# Patient Record
Sex: MALE | Race: BLACK OR AFRICAN AMERICAN | Employment: OTHER | URBAN - METROPOLITAN AREA
[De-identification: names, ages, dates, MRNs, and addresses within clinical notes are randomized per-mention and may not be internally consistent; named-entity substitution may affect disease eponyms.]

---

## 2017-05-26 ENCOUNTER — HOSPITAL ENCOUNTER (OUTPATIENT)
Dept: PREADMISSION TESTING | Age: 37
Discharge: HOME | End: 2017-05-26

## 2017-05-26 ENCOUNTER — HOSPITAL ENCOUNTER (OUTPATIENT)
Dept: LAB | Age: 37
Discharge: HOME | End: 2017-05-26

## 2017-05-26 ENCOUNTER — HOSPITAL ENCOUNTER (EMERGENCY)
Age: 37
Discharge: STILL ADMITTED OR ADMINISTRATIVE | End: 2017-05-26
Attending: EMERGENCY MEDICINE
Payer: MEDICAID

## 2017-05-26 DIAGNOSIS — N20.0 KIDNEY STONES: ICD-10-CM

## 2017-05-26 LAB — SENTARA SPECIMEN COL,SENBCF: NORMAL

## 2017-05-26 PROCEDURE — 99001 SPECIMEN HANDLING PT-LAB: CPT | Performed by: UROLOGY

## 2017-05-26 PROCEDURE — 75810000275 HC EMERGENCY DEPT VISIT NO LEVEL OF CARE

## 2017-06-01 RX ORDER — INSULIN LISPRO 100 [IU]/ML
15 INJECTION, SOLUTION INTRAVENOUS; SUBCUTANEOUS
COMMUNITY

## 2017-06-01 RX ORDER — INSULIN GLARGINE 100 [IU]/ML
70 INJECTION, SOLUTION SUBCUTANEOUS
COMMUNITY

## 2017-06-01 RX ORDER — CLONIDINE HYDROCHLORIDE 0.2 MG/1
0.2 TABLET ORAL 2 TIMES DAILY
COMMUNITY
End: 2021-04-26

## 2017-06-02 ENCOUNTER — ANESTHESIA EVENT (OUTPATIENT)
Dept: SURGERY | Age: 37
End: 2017-06-02
Payer: MEDICAID

## 2017-06-05 ENCOUNTER — APPOINTMENT (OUTPATIENT)
Dept: GENERAL RADIOLOGY | Age: 37
End: 2017-06-05
Attending: UROLOGY
Payer: MEDICAID

## 2017-06-05 ENCOUNTER — ANESTHESIA (OUTPATIENT)
Dept: SURGERY | Age: 37
End: 2017-06-05
Payer: MEDICAID

## 2017-06-05 ENCOUNTER — HOSPITAL ENCOUNTER (OUTPATIENT)
Age: 37
Setting detail: OUTPATIENT SURGERY
Discharge: HOME | End: 2017-06-05
Attending: UROLOGY | Admitting: UROLOGY
Payer: MEDICAID

## 2017-06-05 VITALS
BODY MASS INDEX: 44.1 KG/M2 | TEMPERATURE: 97.6 F | WEIGHT: 315 LBS | SYSTOLIC BLOOD PRESSURE: 142 MMHG | OXYGEN SATURATION: 94 % | HEIGHT: 71 IN | HEART RATE: 82 BPM | DIASTOLIC BLOOD PRESSURE: 72 MMHG | RESPIRATION RATE: 16 BRPM

## 2017-06-05 PROBLEM — N23 RENAL COLIC ON LEFT SIDE: Status: ACTIVE | Noted: 2017-06-05

## 2017-06-05 PROBLEM — N20.1 LEFT URETERAL CALCULUS: Status: ACTIVE | Noted: 2017-06-05

## 2017-06-05 LAB
ATRIAL RATE: 74 BPM
CALCULATED P AXIS, ECG09: 45 DEGREES
CALCULATED R AXIS, ECG10: -63 DEGREES
CALCULATED T AXIS, ECG11: 49 DEGREES
DIAGNOSIS, 93000: NORMAL
GLUCOSE BLD STRIP.AUTO-MCNC: 109 MG/DL (ref 70–110)
GLUCOSE BLD STRIP.AUTO-MCNC: 137 MG/DL (ref 70–110)
P-R INTERVAL, ECG05: 170 MS
Q-T INTERVAL, ECG07: 374 MS
QRS DURATION, ECG06: 100 MS
QTC CALCULATION (BEZET), ECG08: 415 MS
VENTRICULAR RATE, ECG03: 74 BPM

## 2017-06-05 PROCEDURE — 74011000250 HC RX REV CODE- 250

## 2017-06-05 PROCEDURE — 74011250636 HC RX REV CODE- 250/636

## 2017-06-05 PROCEDURE — 76210000016 HC OR PH I REC 1 TO 1.5 HR: Performed by: UROLOGY

## 2017-06-05 PROCEDURE — 77030018846 HC SOL IRR STRL H20 ICUM -A: Performed by: UROLOGY

## 2017-06-05 PROCEDURE — 82962 GLUCOSE BLOOD TEST: CPT

## 2017-06-05 PROCEDURE — C1769 GUIDE WIRE: HCPCS | Performed by: UROLOGY

## 2017-06-05 PROCEDURE — 77030018832 HC SOL IRR H20 ICUM -A: Performed by: UROLOGY

## 2017-06-05 PROCEDURE — 74011636320 HC RX REV CODE- 636/320: Performed by: UROLOGY

## 2017-06-05 PROCEDURE — 76010000161 HC OR TIME 1 TO 1.5 HR INTENSV-TIER 1: Performed by: UROLOGY

## 2017-06-05 PROCEDURE — 74011000258 HC RX REV CODE- 258

## 2017-06-05 PROCEDURE — 76210000020 HC REC RM PH II FIRST 0.5 HR: Performed by: UROLOGY

## 2017-06-05 PROCEDURE — 74011250637 HC RX REV CODE- 250/637: Performed by: ANESTHESIOLOGY

## 2017-06-05 PROCEDURE — 77030006974 HC BSKT URET RTVR BSC -C: Performed by: UROLOGY

## 2017-06-05 PROCEDURE — C2617 STENT, NON-COR, TEM W/O DEL: HCPCS | Performed by: UROLOGY

## 2017-06-05 PROCEDURE — 74011250636 HC RX REV CODE- 250/636: Performed by: NURSE ANESTHETIST, CERTIFIED REGISTERED

## 2017-06-05 PROCEDURE — 77030032490 HC SLV COMPR SCD KNE COVD -B: Performed by: UROLOGY

## 2017-06-05 PROCEDURE — 77030018836 HC SOL IRR NACL ICUM -A: Performed by: UROLOGY

## 2017-06-05 PROCEDURE — C1758 CATHETER, URETERAL: HCPCS | Performed by: UROLOGY

## 2017-06-05 PROCEDURE — 77030012961 HC IRR KT CYSTO/TUR ICUM -A: Performed by: UROLOGY

## 2017-06-05 PROCEDURE — 76060000033 HC ANESTHESIA 1 TO 1.5 HR: Performed by: UROLOGY

## 2017-06-05 PROCEDURE — 74011250636 HC RX REV CODE- 250/636: Performed by: UROLOGY

## 2017-06-05 PROCEDURE — 93005 ELECTROCARDIOGRAM TRACING: CPT

## 2017-06-05 DEVICE — URETERAL STENT
Type: IMPLANTABLE DEVICE | Site: URETER | Status: FUNCTIONAL
Brand: POLARIS™ ULTRA

## 2017-06-05 RX ORDER — GENTAMICIN SULFATE 40 MG/ML
INJECTION, SOLUTION INTRAMUSCULAR; INTRAVENOUS AS NEEDED
Status: DISCONTINUED | OUTPATIENT
Start: 2017-06-05 | End: 2017-06-05 | Stop reason: HOSPADM

## 2017-06-05 RX ORDER — MIDAZOLAM HYDROCHLORIDE 1 MG/ML
INJECTION, SOLUTION INTRAMUSCULAR; INTRAVENOUS AS NEEDED
Status: DISCONTINUED | OUTPATIENT
Start: 2017-06-05 | End: 2017-06-05 | Stop reason: HOSPADM

## 2017-06-05 RX ORDER — INSULIN LISPRO 100 [IU]/ML
INJECTION, SOLUTION INTRAVENOUS; SUBCUTANEOUS ONCE
Status: DISCONTINUED | OUTPATIENT
Start: 2017-06-06 | End: 2017-06-05 | Stop reason: HOSPADM

## 2017-06-05 RX ORDER — MAGNESIUM SULFATE 100 %
4 CRYSTALS MISCELLANEOUS AS NEEDED
Status: DISCONTINUED | OUTPATIENT
Start: 2017-06-05 | End: 2017-06-05 | Stop reason: HOSPADM

## 2017-06-05 RX ORDER — FENTANYL CITRATE 50 UG/ML
INJECTION, SOLUTION INTRAMUSCULAR; INTRAVENOUS AS NEEDED
Status: DISCONTINUED | OUTPATIENT
Start: 2017-06-05 | End: 2017-06-05 | Stop reason: HOSPADM

## 2017-06-05 RX ORDER — SODIUM CHLORIDE 0.9 % (FLUSH) 0.9 %
5-10 SYRINGE (ML) INJECTION AS NEEDED
Status: DISCONTINUED | OUTPATIENT
Start: 2017-06-05 | End: 2017-06-05 | Stop reason: HOSPADM

## 2017-06-05 RX ORDER — DOCUSATE SODIUM 100 MG/1
100 CAPSULE, LIQUID FILLED ORAL 2 TIMES DAILY
Qty: 60 CAP | Refills: 0 | Status: SHIPPED | OUTPATIENT
Start: 2017-06-05 | End: 2017-06-13

## 2017-06-05 RX ORDER — DEXTROSE MONOHYDRATE 25 G/50ML
25-50 INJECTION, SOLUTION INTRAVENOUS AS NEEDED
Status: DISCONTINUED | OUTPATIENT
Start: 2017-06-05 | End: 2017-06-05 | Stop reason: HOSPADM

## 2017-06-05 RX ORDER — CEFAZOLIN SODIUM 2 G/50ML
2 SOLUTION INTRAVENOUS
Status: DISCONTINUED | OUTPATIENT
Start: 2017-06-05 | End: 2017-06-05

## 2017-06-05 RX ORDER — HYDROMORPHONE HYDROCHLORIDE 2 MG/ML
0.5 INJECTION, SOLUTION INTRAMUSCULAR; INTRAVENOUS; SUBCUTANEOUS AS NEEDED
Status: DISCONTINUED | OUTPATIENT
Start: 2017-06-05 | End: 2017-06-05 | Stop reason: HOSPADM

## 2017-06-05 RX ORDER — LIDOCAINE HYDROCHLORIDE 20 MG/ML
INJECTION, SOLUTION EPIDURAL; INFILTRATION; INTRACAUDAL; PERINEURAL AS NEEDED
Status: DISCONTINUED | OUTPATIENT
Start: 2017-06-05 | End: 2017-06-05 | Stop reason: HOSPADM

## 2017-06-05 RX ORDER — PROPOFOL 10 MG/ML
INJECTION, EMULSION INTRAVENOUS AS NEEDED
Status: DISCONTINUED | OUTPATIENT
Start: 2017-06-05 | End: 2017-06-05 | Stop reason: HOSPADM

## 2017-06-05 RX ORDER — ONDANSETRON 2 MG/ML
4 INJECTION INTRAMUSCULAR; INTRAVENOUS ONCE
Status: DISCONTINUED | OUTPATIENT
Start: 2017-06-05 | End: 2017-06-05 | Stop reason: HOSPADM

## 2017-06-05 RX ORDER — OXYCODONE AND ACETAMINOPHEN 5; 325 MG/1; MG/1
1-2 TABLET ORAL
Qty: 30 TAB | Refills: 0 | Status: SHIPPED | OUTPATIENT
Start: 2017-06-05 | End: 2017-06-05

## 2017-06-05 RX ORDER — INSULIN LISPRO 100 [IU]/ML
INJECTION, SOLUTION INTRAVENOUS; SUBCUTANEOUS ONCE
Status: DISCONTINUED | OUTPATIENT
Start: 2017-06-05 | End: 2017-06-05 | Stop reason: HOSPADM

## 2017-06-05 RX ORDER — SODIUM CHLORIDE, SODIUM LACTATE, POTASSIUM CHLORIDE, CALCIUM CHLORIDE 600; 310; 30; 20 MG/100ML; MG/100ML; MG/100ML; MG/100ML
125 INJECTION, SOLUTION INTRAVENOUS CONTINUOUS
Status: DISCONTINUED | OUTPATIENT
Start: 2017-06-05 | End: 2017-06-05 | Stop reason: HOSPADM

## 2017-06-05 RX ORDER — OXYCODONE AND ACETAMINOPHEN 5; 325 MG/1; MG/1
1 TABLET ORAL
Status: COMPLETED | OUTPATIENT
Start: 2017-06-05 | End: 2017-06-05

## 2017-06-05 RX ORDER — CEPHALEXIN 500 MG/1
500 CAPSULE ORAL 3 TIMES DAILY
Qty: 21 CAP | Refills: 0 | Status: SHIPPED | OUTPATIENT
Start: 2017-06-05 | End: 2017-06-12

## 2017-06-05 RX ORDER — HYDROMORPHONE HYDROCHLORIDE 2 MG/1
2 TABLET ORAL
Qty: 15 TAB | Refills: 0 | Status: SHIPPED | OUTPATIENT
Start: 2017-06-05 | End: 2021-04-26

## 2017-06-05 RX ORDER — SODIUM CHLORIDE, SODIUM LACTATE, POTASSIUM CHLORIDE, CALCIUM CHLORIDE 600; 310; 30; 20 MG/100ML; MG/100ML; MG/100ML; MG/100ML
50 INJECTION, SOLUTION INTRAVENOUS CONTINUOUS
Status: DISCONTINUED | OUTPATIENT
Start: 2017-06-06 | End: 2017-06-05 | Stop reason: HOSPADM

## 2017-06-05 RX ORDER — SODIUM CHLORIDE 0.9 % (FLUSH) 0.9 %
5-10 SYRINGE (ML) INJECTION EVERY 8 HOURS
Status: DISCONTINUED | OUTPATIENT
Start: 2017-06-05 | End: 2017-06-05 | Stop reason: HOSPADM

## 2017-06-05 RX ORDER — FAMOTIDINE 20 MG/1
TABLET, FILM COATED ORAL
Status: DISCONTINUED
Start: 2017-06-05 | End: 2017-06-05 | Stop reason: HOSPADM

## 2017-06-05 RX ORDER — FAMOTIDINE 20 MG/1
20 TABLET, FILM COATED ORAL ONCE
Status: DISCONTINUED | OUTPATIENT
Start: 2017-06-06 | End: 2017-06-05 | Stop reason: HOSPADM

## 2017-06-05 RX ADMIN — FENTANYL CITRATE 50 MCG: 50 INJECTION, SOLUTION INTRAMUSCULAR; INTRAVENOUS at 13:50

## 2017-06-05 RX ADMIN — SODIUM CHLORIDE, SODIUM LACTATE, POTASSIUM CHLORIDE, AND CALCIUM CHLORIDE 125 ML/HR: 600; 310; 30; 20 INJECTION, SOLUTION INTRAVENOUS at 15:21

## 2017-06-05 RX ADMIN — FENTANYL CITRATE 50 MCG: 50 INJECTION, SOLUTION INTRAMUSCULAR; INTRAVENOUS at 14:31

## 2017-06-05 RX ADMIN — LIDOCAINE HYDROCHLORIDE 100 MG: 20 INJECTION, SOLUTION EPIDURAL; INFILTRATION; INTRACAUDAL; PERINEURAL at 13:50

## 2017-06-05 RX ADMIN — PROPOFOL 200 MG: 10 INJECTION, EMULSION INTRAVENOUS at 13:50

## 2017-06-05 RX ADMIN — FENTANYL CITRATE 50 MCG: 50 INJECTION, SOLUTION INTRAMUSCULAR; INTRAVENOUS at 14:08

## 2017-06-05 RX ADMIN — SODIUM CHLORIDE, SODIUM LACTATE, POTASSIUM CHLORIDE, AND CALCIUM CHLORIDE 50 ML/HR: 600; 310; 30; 20 INJECTION, SOLUTION INTRAVENOUS at 12:59

## 2017-06-05 RX ADMIN — FENTANYL CITRATE 50 MCG: 50 INJECTION, SOLUTION INTRAMUSCULAR; INTRAVENOUS at 14:01

## 2017-06-05 RX ADMIN — OXYCODONE HYDROCHLORIDE AND ACETAMINOPHEN 1 TABLET: 5; 325 TABLET ORAL at 16:46

## 2017-06-05 RX ADMIN — HYDROMORPHONE HYDROCHLORIDE 0.5 MG: 2 INJECTION INTRAMUSCULAR; INTRAVENOUS; SUBCUTANEOUS at 15:39

## 2017-06-05 RX ADMIN — HYDROMORPHONE HYDROCHLORIDE 0.5 MG: 2 INJECTION INTRAMUSCULAR; INTRAVENOUS; SUBCUTANEOUS at 15:24

## 2017-06-05 RX ADMIN — HYDROMORPHONE HYDROCHLORIDE 0.5 MG: 2 INJECTION INTRAMUSCULAR; INTRAVENOUS; SUBCUTANEOUS at 15:18

## 2017-06-05 RX ADMIN — MIDAZOLAM HYDROCHLORIDE 2 MG: 1 INJECTION, SOLUTION INTRAMUSCULAR; INTRAVENOUS at 13:50

## 2017-06-05 RX ADMIN — SODIUM CHLORIDE, SODIUM LACTATE, POTASSIUM CHLORIDE, AND CALCIUM CHLORIDE 125 ML/HR: 600; 310; 30; 20 INJECTION, SOLUTION INTRAVENOUS at 15:41

## 2017-06-05 NOTE — BRIEF OP NOTE
BRIEF OPERATIVE NOTE    Date of Procedure: 6/5/2017   Preoperative Diagnosis: left proximal ureteral calculus with refractory colic S/P stent placement. Postoperative Diagnosis: Left kidney stone, radiolucent. Procedure(s):  CYSTOSCOPY WITH left RETROGRADES, left ureteroscopy,holmium laser litho left uretera stent placement  Surgeon(s) and Role:     * Ca Thomas MD - Primary         Assistant Staff:       Surgical Staff:  Circ-1: Vidal Walls  Radiology Technician: Maryellen Pedersen  Scrub Tech-1: Enid Helm    Event Time In   Incision Start  2:07 PM   Incision Close  2:53 PM     Anesthesia: General   Estimated Blood Loss: minimal  Specimens: none  Findings: Large lower pole renal calculus migrated up to the lower pole from proximal ureter at time of stent placement. Fragmented totally. No stone fragments greater than 1 mm. Complications: none  Implants:   Implant Name Type Inv.  Item Serial No.  Lot No. LRB No. Used Action   Nelsy Munguia DBL-PGTL [id number] -- [employer] - [id number]   Nelsy Munguia DBL-PGTL [id number] -- Latosha Jones [facility] [id number] Left 1 Implanted       Ca Thomas MD

## 2017-06-05 NOTE — H&P
H&P (Cysto, left ureteroscopy, left retrograde pyelogram, possible JJ stent replacement with holmium laser)     ASSESSMENT:       ICD-10-CM ICD-9-CM     1. Ureteral stone N20.1 592. 1     2. Kidney stone N20.0 592.0 AMB POC URINALYSIS DIP STICK AUTO W/O MICRO   3. Nocturia R35.1 788.43 AMB POC PVR, ESAU,POST-VOID RES,US,NON-IMAGING          1. Mildly obstructing proximal left ureteral calculus measuring 1.6 cm seen on CT A/P w/ cont 3/24/17  2. Left non-obstructing kidney stones: multiple additional small calculi within the more proximal left ureter, left renal pelvis, and lower pole collecting system seen on CT A/P w/ cont 3/24/17. 3. Cellulitis in left foot: marked inflammation. Hot. Pt with history of recurrent cellulitis. Last infection last July. Report to ER now.          PLAN:    1. Pt told to report to ER for cellulitis. 2. Discussed options for treatment of stones: lithotripsy vs. Ureteroscopy. Recommend ureteroscopy to help treat multiple stones. Discussed indications and r/b. Will schedule for after pt's cellulitis clears. 3. Follow up after surgery for LithoLink, discuss dietary modifications.      To OR for cysto, left ureteroscopy, left retrograde, possible left JJ stent replacement with holmium laser.                 Chief Complaint   Patient presents with    Kidney Stone       stent placed at ED 4/23/17         HISTORY OF PRESENT ILLNESS: Marek Brooks is a 43 y.o. male who presents today in hospital follow up for left ureteral calculus. Pt reported to ED on 3/24/17 with flank pain and f/c/n/v. Presenting CT A/P showed mildly obstructing proximal left ureteral calculus measuring 1.6 cm and multiple non-obstructing stones in the left kidney. Pt was managed with pain medications for one month before cysto, left JJ stent placement by Dr. Suhail Phoenix on 4/24/17. He presents today with stent in place. Follow up [address] shows stent in good placement.  Pt presents today without noticeable abdominal pain. He is tolerating stent well.      Pt does have noticeable inflammation in the left foot today. It is very hot. Pt is in a lot of pain. He has history of cellulitis and has had multiple flares similar to this one.      Review of Systems  Constitutional: Fever: No  Skin: Rash: No  HEENT: Hearing difficulty: No  Eyes: Blurred vision: No  Cardiovascular: Chest pain: No  Respiratory: Shortness of breath: No  Gastrointestinal: Nausea/vomiting: No  Musculoskeletal: Back pain: Yes  herniated disc  Neurological: Weakness: No  Psychological: Memory loss: No  Comments/additional findings:                            Past Medical History:   Diagnosis Date    Acute kidney injury ([facility] [address]      Anemia       chronic microcytic.  Atrial fibrillation with RVR (HCC)      Chronic cough      Chronic diastolic (congestive) heart failure (HCC)      CKD (chronic kidney disease), stage II      Diabetes mellitus (HCC)      Essential hypertension, malignant      Gastritis and duodenitis      Head ache      History of MRSA infection      Hyperlipidemia      Immunosuppression (HCC)      Kidney stone      Morbid obesity (HCC)      SOLOMON on CPAP      Pulmonary embolus (HCC)      Renal calculi      Unspecified essential hypertension                 Past Surgical History:   Procedure Laterality Date    HX KNEE ARTHROSCOPY        HX OTHER SURGICAL         Thoracoscopy - multiple.  HX UROLOGICAL Left 04/23/2017     [facility]. Cysto ih stent.  Dr. Kali Benites.                 Social History   Substance Use Topics    Smoking status: Former Smoker       Types: Cigarettes    Smokeless tobacco: Never Used    Alcohol use 0.6 oz/week        1 Glasses of wine per week                Allergies   Allergen Reactions    Norco [Hydrocodone-Acetaminophen] Other (comments)       Pt states he is not allergic to norco               Family History   Problem Relation Age of Onset    Cancer Mother      Hypertension Mother      Migraines Mother      Diabetes Father      Hypertension Father      Cancer Maternal Grandmother      Hypertension Maternal Grandmother      Migraines Maternal Grandmother      Cancer Maternal Grandfather                  Current Outpatient Prescriptions   Medication Sig Dispense Refill    amLODIPine (NORVASC) 10 mg tablet          ELIQUIS 5 mg tablet          carvedilol (COREG) 25 mg tablet          cloNIDine (CATAPRES) 0.3 mg/24 hr APPLY 1 PATCH AS DIRECTED EVERY 7 DAYS   1    glipiZIDE (GLUCOTROL) 5 mg tablet          hydrALAZINE (APRESOLINE) 100 mg tablet          chlorpheniramine-HYDROcodone (TUSSIONEX) 10-8 mg/5 mL suspension SHAKE WELL AND TAKE 5MLS PO BID   0    omeprazole (PRILOSEC) 40 mg capsule          ondansetron (ZOFRAN ODT) 4 mg disintegrating tablet dissolve 1 tablet ON TONGUE every 8 hours if needed for nausea and vomiting   0    predniSONE (DELTASONE) 2.5 mg tablet          oxyCODONE-acetaminophen (PERCOCET 10)  mg per tablet          sucralfate (CARAFATE) 1 gram tablet TK 1 T PO Q 6 HOURS   0    tamsulosin (FLOMAX) 0.4 mg capsule          trospium (SANCTURA) 20 mg tablet                  PHYSICAL EXAMINATION:        Visit Vitals    BP (!) 140/92    Ht 5' 11\" (1.803 m)    Wt 316 lb (143.3 kg)    BMI 44.07 kg/m2      Constitutional: WDWN, Pleasant and appropriate affect, No acute distress. CV: No peripheral swelling noted, RRR  Respiratory: No respiratory distress or difficulties, CTAB  Abdomen: No abdominal masses or tenderness. No CVA tenderness. No inguinal hernias noted. Skin: No jaundice, left foot erythema and warmth with tenderness. Neuro/Psych: Alert and oriented x 3, affect appropriate. Lymphatic: No enlarged inguinal lymph nodes.         REVIEW OF LABS AND IMAGING:   No UA today.      Imaging Report Reviewed? YES Type: CT scan, NIRAJ  Images Reviewed? YES Type: CT scan     US Abd 4/25/2017:  IMPRESSION:   No hydronephrosis. Left renal stent is present.   Simple appearing left renal cyst.     CT A/P w/ cont 3/24/17:  Impression:    Mildly obstructing proximal left ureteral calculus measuring 1.6 x 0.6 cm. There are multiple additional small calculi within the more proximal left ureter, left renal pelvis, and lower pole collecting system. Multiple bilateral renal low density masses unchanged from December 2016. Hepatomegaly with diffuse steatosis of the liver.  Liver size may be slightly larger than December 2016           CC: FANTA Mckinney MD on 4/29/2017

## 2017-06-05 NOTE — DISCHARGE INSTRUCTIONS
Discharge Instructions      Patient: Mira Rangel               Sex: male          DOA: 6/5/2017         YOB: 1980      Age:  43 y.o.        LOS:  LOS: 0 days     ACUTE DIAGNOSES:  kidney stone      CHRONIC MEDICAL DIAGNOSES:  Problem List as of 6/5/2017  Date Reviewed: 6/5/2017          Codes Class Noted - Resolved    Left ureteral calculus ICD-10-CM: N20.1  ICD-9-CM: 592.1  6/5/2017 - Present        Renal colic on left side [id number]: N23  ICD-9-CM: 788.0  6/5/2017 - Present              DISCHARGE MEDICATIONS:       · It is important that you take the medication exactly as they are prescribed. · Keep your medication in the bottles provided by the pharmacist and keep a list of the medication names, dosages, and times to be taken in your wallet. · Do not take other medications without consulting your doctor. DIET:  Regular Diet    ACTIVITY: No lifting, Driving, or Strenuous exercise for 1 week    ADDITIONAL INFORMATION: If you experience any of the following symptoms then please call your primary care physician or return to the emergency room if you cannot get hold of your doctor: Fever, chills, nausea, vomiting, diarrhea, change in mentation, falling, bleeding, shortness of breath. FOLLOW UP CARE:  Dr. Vikki Vance NP as regularly scheduled. Follow-up with Kate Campuzano MD in 7 days for cysto/stent removal.      Information obtained by :  I understand that if any problems occur once I am at home I am to contact my physician. I understand and acknowledge receipt of the instructions indicated above.                                                                                                                                            Physician's or R.N.'s Signature                                                                  Date/Time Patient or Representative Signature                                                          Date/Time      DISCHARGE SUMMARY from Nurse    The following personal items are in your possession at time of discharge:    Dental Appliances: None  Visual Aid: Glasses                            PATIENT INSTRUCTIONS:    After general anesthesia or intravenous sedation, for 24 hours or while taking prescription Narcotics:  · Limit your activities  · Do not drive and operate hazardous machinery  · Do not make important personal or business decisions  · Do  not drink alcoholic beverages  · If you have not urinated within 8 hours after discharge, please contact your surgeon on call. Report the following to your surgeon:  · Excessive pain, swelling, redness or odor of or around the surgical area  · Temperature over 100.5  · Nausea and vomiting lasting longer than 4 hours or if unable to take medications  · Any signs of decreased circulation or nerve impairment to extremity: change in color, persistent  numbness, tingling, coldness or increase pain  · Any questions        What to do at Home:        These are general instructions for a healthy lifestyle:    No smoking/ No tobacco products/ Avoid exposure to second hand smoke    Surgeon General's Warning:  Quitting smoking now greatly reduces serious risk to your health. Obesity, smoking, and sedentary lifestyle greatly increases your risk for illness    A healthy diet, regular physical exercise & weight monitoring are important for maintaining a healthy lifestyle    You may be retaining fluid if you have a history of heart failure or if you experience any of the following symptoms:  Weight gain of 3 pounds or more overnight or 5 pounds in a week, increased swelling in our hands or feet or shortness of breath while lying flat in bed. Please call your doctor as soon as you notice any of these symptoms; do not wait until your next office visit.     Recognize signs and symptoms of STROKE:    F-face looks uneven    A-arms unable to move or move unevenly    S-speech slurred or non-existent    T-time-call 911 as soon as signs and symptoms begin-DO NOT go       Back to bed or wait to see if you get better-TIME IS BRAIN. Warning Signs of HEART ATTACK     Call 911 if you have these symptoms:   Chest discomfort. Most heart attacks involve discomfort in the center of the chest that lasts more than a few minutes, or that goes away and comes back. It can feel like uncomfortable pressure, squeezing, fullness, or pain.  Discomfort in other areas of the upper body. Symptoms can include pain or discomfort in one or both arms, the back, neck, jaw, or stomach.  Shortness of breath with or without chest discomfort.  Other signs may include breaking out in a cold sweat, nausea, or lightheadedness. Don't wait more than five minutes to call 911 - MINUTES MATTER! Fast action can save your life. Calling 911 is almost always the fastest way to get lifesaving treatment. Emergency Medical Services staff can begin treatment when they arrive -- up to an hour sooner than if someone gets to the hospital by car. The discharge information has been reviewed with the patient. The patient verbalized understanding. Discharge medications reviewed with the patient and appropriate educational materials and side effects teaching were provided. Patient armband removed and given to patient to take home.   Patient was informed of the privacy risks if armband lost or stolen

## 2017-06-05 NOTE — ANESTHESIA POSTPROCEDURE EVALUATION
Post-Anesthesia Evaluation and Assessment    Patient: Adrian Hardin MRN: [id number]  SSN: [id number]    YOB: 1980  Age: 43 y.o. Sex: male      Data from PACU flowsheet    Cardiovascular Function/Vital Signs  Visit Vitals    BP (!) 158/99    Pulse 80    Temp 36.4 °C (97.6 °F)    Resp 13    Ht 5' 11\" (1.803 m)    Wt 145.7 kg (321 lb 5 oz)    SpO2 93%    BMI 44.81 kg/m2       Patient is status post general anesthesia for Procedure(s):  CYSTOSCOPY WITH left RETROGRADES, left ureteroscopy,holmium laser litho left uretera stent placement. Nausea/Vomiting: controlled    Postoperative hydration reviewed and adequate. Pain:  Pain Scale 1: Numeric (0 - 10) (06/05/17 1539)  Pain Intensity 1: 8 (06/05/17 1539)   Managed      Mental Status and Level of Consciousness: Alert and oriented     Pulmonary Status:   O2 Device: CO2 nasal cannula (06/05/17 1510)   Adequate oxygenation and airway patent    Complications related to anesthesia: None    Post-anesthesia assessment completed.  No concerns    Signed By: Phi Chin MD     June 5, 2017

## 2017-06-05 NOTE — IP AVS SNAPSHOT
[place] [address] Lucille Hernandez  
[phone] Patient: Fercho Mitchell MRN: [id number] :1980 You are allergic to the following Allergen Reactions Norco (Hydrocodone-Acetaminophen) Other (comments) Pt states he is not allergic to norco  
  
Recent Documentation Height Weight BMI Smoking Status 1.803 m 145.7 kg 44.81 kg/m2 Former Smoker Emergency Contacts Name Discharge Info Relation Home Work Mobile Dori Branham DISCHARGE CAREGIVER [3] Parent [1] [phone] About your hospitalization You were admitted on:  2017 [facility] You were discharged on:  2017 Unit phone number:  [phone] Why you were hospitalized Your primary diagnosis was:  Not on File Your diagnoses also included:  Left Ureteral Calculus, Renal Colic On Left Side Providers Seen During Your Hospitalizations Provider Role Specialty Primary office phone Enio Torres MD Attending Provider Urology [phone] Your Primary Care Physician (PCP) Primary Care Physician Office Phone Office Fax Valeria Gordillo [phone] Follow-up Information Follow up With Details Comments Contact Info Tia Prescott NP   [address] [place] [address] [address] [place] [address] [phone] Current Discharge Medication List  
  
START taking these medications Dose & Instructions Dispensing Information Comments Morning Noon Evening Bedtime  
 cephALEXin 500 mg capsule Commonly known as:  Victor Hugo Fraction Your last dose was: Your next dose is:    
   
   
 Dose:  500 mg Take 1 Cap by mouth three (3) times daily for 7 days. Quantity:  21 Cap Refills:  0  
     
   
   
   
  
 docusate sodium 100 mg capsule Commonly known as:  Chiqui Genao Your last dose was: Your next dose is:    
   
   
 Dose:  100 mg Take 1 Cap by mouth two (2) times a day for 30 days. Quantity:  60 Cap Refills:  0 CONTINUE these medications which have CHANGED Dose & Instructions Dispensing Information Comments Morning Noon Evening Bedtime * oxyCODONE-acetaminophen  mg per tablet Commonly known as:  PERCOCET 10 What changed:  Another medication with the same name was added. Make sure you understand how and when to take each. Your last dose was: Your next dose is:    
   
   
  Refills:  0  
     
   
   
   
  
 * oxyCODONE-acetaminophen 5-325 mg per tablet Commonly known as:  PERCOCET What changed: You were already taking a medication with the same name, and this prescription was added. Make sure you understand how and when to take each. Your last dose was: Your next dose is:    
   
   
 Dose:  1-2 Tab Take 1-2 Tabs by mouth every six (6) hours as needed for Pain. Max Daily Amount: 8 Tabs. Quantity:  30 Tab Refills:  0  
     
   
   
   
  
 * Notice: This list has 2 medication(s) that are the same as other medications prescribed for you. Read the directions carefully, and ask your doctor or other care provider to review them with you. CONTINUE these medications which have NOT CHANGED Dose & Instructions Dispensing Information Comments Morning Noon Evening Bedtime  
 amLODIPine 10 mg tablet Commonly known as:  Jerry Casas Your last dose was: Your next dose is:    
   
   
  Refills:  0  
     
   
   
   
  
 carvedilol 25 mg tablet Commonly known as:  Ale Tyson Your last dose was: Your next dose is:    
   
   
  Refills:  0  
     
   
   
   
  
 cloNIDine HCl 0.2 mg tablet Commonly known as:  CATAPRES Your last dose was: Your next dose is:    
   
   
 Dose:  0.2 mg Take 0.2 mg by mouth two (2) times a day. Refills:  0 ELIQUIS 5 mg tablet Generic drug:  apixaban Your last dose was: Your next dose is:    
   
   
 Dose:  5 mg  
5 mg two (2) times a day. Refills:  0  
     
   
   
   
  
 glipiZIDE 5 mg tablet Commonly known as:  Torrie Healy Your last dose was: Your next dose is:    
   
   
 Dose:  5 mg  
5 mg two (2) times a day. Refills:  0 HumaLOG 100 unit/mL injection Generic drug:  insulin lispro Your last dose was: Your next dose is:    
   
   
 Dose:  15 Units 15 Units by SubCUTAneous route three (3) times daily (with meals). Refills:  0  
     
   
   
   
  
 hydrALAZINE 100 mg tablet Commonly known as:  APRESOLINE Your last dose was: Your next dose is:    
   
   
 Dose:  100 mg  
100 mg two (2) times a day. Refills:  0  
     
   
   
   
  
 LANTUS 100 unit/mL injection Generic drug:  insulin glargine Your last dose was: Your next dose is:    
   
   
 Dose:  70 Units 70 Units by SubCUTAneous route nightly. Refills:  0  
     
   
   
   
  
 omeprazole 40 mg capsule Commonly known as:  PRILOSEC Your last dose was: Your next dose is:    
   
   
 Dose:  40 mg  
40 mg daily. Refills:  0  
     
   
   
   
  
 ondansetron 4 mg disintegrating tablet Commonly known as:  ZOFRAN ODT Your last dose was: Your next dose is:    
   
   
 dissolve 1 tablet ON TONGUE every 8 hours if needed for nausea and vomiting Refills:  0  
     
   
   
   
  
 predniSONE 2.5 mg tablet Commonly known as:  Jolyne Crea Your last dose was: Your next dose is:    
   
   
 every other day. Refills:  0  
     
   
   
   
  
 sucralfate 1 gram tablet Commonly known as:  Ganesh Reed Your last dose was: Your next dose is:    
   
   
 TK 1 T PO  Q 6 HOURS Refills:  0  
     
   
   
   
  
 tamsulosin 0.4 mg capsule Commonly known as:  FLOMAX Your last dose was: Your next dose is:    
   
   
 Dose:  0.4 mg  
0.4 mg daily. Refills:  0  
     
   
   
   
  
 trospium 20 mg tablet Commonly known as:  Earl Golden Your last dose was: Your next dose is:    
   
   
 daily. Refills:  0 Where to Get Your Medications Information on where to get these meds will be given to you by the nurse or doctor. ! Ask your nurse or doctor about these medications  
  cephALEXin 500 mg capsule  
 docusate sodium 100 mg capsule  
 oxyCODONE-acetaminophen 5-325 mg per tablet Discharge Instructions Discharge Instructions Patient: Neisha Samayoa               Sex: male          DOA: 6/5/2017 YOB: 1980      Age:  43 y.o.        LOS:  LOS: 0 days ACUTE DIAGNOSES: 
kidney stone CHRONIC MEDICAL DIAGNOSES: 
Problem List as of 6/5/2017  Date Reviewed: 6/5/2017 Codes Class Noted - Resolved Left ureteral calculus ICD-10-CM: N20.1 ICD-9-CM: 592.1  6/5/2017 - Present Renal colic on left side KJI-89-IA: N23 
ICD-9-CM: 788.0  6/5/2017 - Present DISCHARGE MEDICATIONS:  
 
 
· It is important that you take the medication exactly as they are prescribed. · Keep your medication in the bottles provided by the pharmacist and keep a list of the medication names, dosages, and times to be taken in your wallet. · Do not take other medications without consulting your doctor. DIET:  Regular Diet ACTIVITY: No lifting, Driving, or Strenuous exercise for 1 week ADDITIONAL INFORMATION: If you experience any of the following symptoms then please call your primary care physician or return to the emergency room if you cannot get hold of your doctor: Fever, chills, nausea, vomiting, diarrhea, change in mentation, falling, bleeding, shortness of breath. FOLLOW UP CARE: 
Dr. Freddy Stuart NP as regularly scheduled. Follow-up with Haven Pierce MD in 7 days for cysto/stent removal. 
 
 
Information obtained by : 
I understand that if any problems occur once I am at home I am to contact my physician. I understand and acknowledge receipt of the instructions indicated above. Physician's or R.N.'s Signature                                                                  Date/Time Patient or Representative Signature                                                          Date/Time DISCHARGE SUMMARY from Nurse The following personal items are in your possession at time of discharge: 
 
Dental Appliances: None Visual Aid: Glasses PATIENT INSTRUCTIONS: 
 
After general anesthesia or intravenous sedation, for 24 hours or while taking prescription Narcotics: · Limit your activities · Do not drive and operate hazardous machinery · Do not make important personal or business decisions · Do  not drink alcoholic beverages · If you have not urinated within 8 hours after discharge, please contact your surgeon on call. Report the following to your surgeon: 
· Excessive pain, swelling, redness or odor of or around the surgical area · Temperature over 100.5 · Nausea and vomiting lasting longer than 4 hours or if unable to take medications · Any signs of decreased circulation or nerve impairment to extremity: change in color, persistent  numbness, tingling, coldness or increase pain · Any questions What to do at Home: These are general instructions for a healthy lifestyle: No smoking/ No tobacco products/ Avoid exposure to second hand smoke Surgeon General's Warning:  Quitting smoking now greatly reduces serious risk to your health. Obesity, smoking, and sedentary lifestyle greatly increases your risk for illness A healthy diet, regular physical exercise & weight monitoring are important for maintaining a healthy lifestyle You may be retaining fluid if you have a history of heart failure or if you experience any of the following symptoms:  Weight gain of 3 pounds or more overnight or 5 pounds in a week, increased swelling in our hands or feet or shortness of breath while lying flat in bed. Please call your doctor as soon as you notice any of these symptoms; do not wait until your next office visit. Recognize signs and symptoms of STROKE: 
 
F-face looks uneven A-arms unable to move or move unevenly S-speech slurred or non-existent T-time-call 911 as soon as signs and symptoms begin-DO NOT go Back to bed or wait to see if you get better-TIME IS BRAIN. Warning Signs of HEART ATTACK Call 911 if you have these symptoms: 
? Chest discomfort. Most heart attacks involve discomfort in the center of the chest that lasts more than a few minutes, or that goes away and comes back. It can feel like uncomfortable pressure, squeezing, fullness, or pain. ? Discomfort in other areas of the upper body. Symptoms can include pain or discomfort in one or both arms, the back, neck, jaw, or stomach. ? Shortness of breath with or without chest discomfort. ? Other signs may include breaking out in a cold sweat, nausea, or lightheadedness. Don't wait more than five minutes to call [address]! Fast action can save your life. Calling 911 is almost always the fastest way to get lifesaving treatment. Emergency Medical Services staff can begin treatment when they arrive  up to an hour sooner than if someone gets to the hospital by car. The discharge information has been reviewed with the patient.   The patient verbalized understanding. Discharge medications reviewed with the patient and appropriate educational materials and side effects teaching were provided. Patient armband removed and given to patient to take home. Patient was informed of the privacy risks if armband lost or stolen Discharge Orders None Introducing [facility] [place] [facility]! [facility] introduces [employer] patient portal. Now you can access parts of your medical record, email your doctor's office, and request medication refills online. 1. In your internet browser, go to [email or web address] 2. Click on the First Time User? Click Here link in the Sign In box. You will see the New Member Sign Up page. 3. Enter your [employer] Access Code exactly as it appears below. You will not need to use this code after youve completed the sign-up process. If you do not sign up before the expiration date, you must request a new code. · [employer] Access Code: [email or web address] Expires: 6/27/2017 12:50 PM 
 
4. Enter the last four digits of your Social Security Number (xxxx) and Date of Birth (mm/dd/yyyy) as indicated and click Submit. You will be taken to the next sign-up page. 5. Create a [employer] ID. This will be your [employer] login ID and cannot be changed, so think of one that is secure and easy to remember. 6. Create a [employer] password. You can change your password at any time. 7. Enter your Password Reset Question and Answer. This can be used at a later time if you forget your password. 8. Enter your e-mail address. You will receive e-mail notification when new information is available in [address] 9. Click Sign Up. You can now view and download portions of your medical record. 10. Click the Download Summary menu link to download a portable copy of your medical information. If you have questions, please visit the Frequently Asked Questions section of the [employer] website.  Remember, [employer] is NOT to be used for urgent needs. For medical emergencies, dial 911. Now available from your iPhone and Android! General Information Please provide this summary of care documentation to your next provider. Patient Signature:  ____________________________________________________________ Date:  ____________________________________________________________  
  
Elena Fence Provider Signature:  ____________________________________________________________ Date:  ____________________________________________________________

## 2017-06-06 NOTE — OP NOTES
Johnson Calvillo    Name:  Kasey Singh  MR#:  [id number]  :  1980  Account #:  [de-identified]  Date of Adm:  2017  Date of Surgery:  2017      PREOPERATIVE DIAGNOSIS:  Left proximal ureteral calculus with  refractory colic, status post stent placement at the end of 2017. POSTOPERATIVE DIAGNOSIS:  Left proximal ureteral calculus with  refractory colic, status post stent placement at the end of 2017,  with left renal calculus (radiolucent). PROCEDURES PERFORMED:  Cystoscopy, with left retrograde  ureteropyelogram and left ureteroscopy, with holmium laser lithotripsy  and left Double-J ureteral stent placement. SURGEON:  Yana Mendez M.D.    ESTIMATED BLOOD LOSS:  Minimal.    SPECIMENS REMOVED:  None. ANESTHESIA:  General.    EVENT START TIME:  2:07 p.m. EVENT CLOSE TIME:  2:53 p.m. INTRAOPERATIVE FINDINGS:  Large lower pole renal calculus  migrated up to the lower pole from the proximal ureter at the time of  stent placement in late 2017. This stone was totally fragmented. No stones were left greater than 1 mm. Many of the fragments were  dusted, and much of the dust was coming out transureterally. COMPLICATIONS:  None. IMPLANTS:  A 6-Upper sorbian x 28 cm Double-J ureteral stent in the left  ureter. INDICATION FOR THE PROCEDURE:  This is a 68-year-old male  who had refractory left renal colic. He was found to have a large  proximal ureteral calculus. He had refractory colic, and a Double-J  stent was placed by my partner, Dr. Malachi Oconnell. He now presents for a  cystoscopy and left ureteroscopy. The patient's stone is essentially  radiolucent, and the patient now presents for this. DESCRIPTION OF PROCEDURE:  The patient was identified in the  holding area. Giving informed consent, he was then taken to the  cystoscopy suite and placed on the cystoscopy table in the supine  position.   After adequate anesthesia, the patient was placed into the  dorsal lithotomy position. He was appropriately padded, prepped, and  draped in the usual sterile fashion for cystoscopy. A timeout was  taken, with all reviewing the procedure and laterality. Burn precautions  and beta blocker concerns were addressed. SCDs were placed for  DVT prophylaxis. Appropriate parenteral prophylactic antibiotics were  given. The patient then underwent a cystoscopy using the 21-Romanian  cystoscope sheath, with 30-and 70-degree lenses. The urethra was  unremarkable, except for the prostatic urethra, which did have  significant trilobar hypertrophy with a large median lobe. We were able  to negotiate this with minimal trauma. Then, we saw the Double-J stent  in place. The rest of the bladder was unremarkable, except for mild  bladder trabeculations. We then removed the Double-J stent to the  level of the urethral meatus and placed a 0.038 Glidewire up the ureter  into the left renal collecting system. Once this was performed, I placed  the dual-lumen catheter and performed a left retrograde  ureteropyelogram.  There was a definite cut off at the proximal ureter,  lending a thought that there might still be a stone in the proximal ureter  at around L4. At this point, I felt that it was best not to place a ureteral  access catheter and proceed cautiously with a ureteroscopy in case  there was a proximal ureteral calculus. At this point, I placed a second wire through the double-lumen catheter  with a 0.038 Sensor wire. Once I did this, I placed the flexible  ureteroscope over the working Sensor wire, and I was able to traverse  up the ureter without difficulty. I did not see a stone along the way. I  then went and explored all the calices. I found a large stone in the  lower pole, which I felt was the offending stone, which had migrated  into the lower pole. There were a couple of smaller stones next to it.   At this point, I inspected all the other calices and saw no other stones. I went down to the proximal ureter again and saw no stone there. I  went back up to the lower pole. Using the 2.7 micron laser fiber, I then  laser ablated the stone into multiple pieces, chasing it into the lower  pole minor calices. Once I did this, I then proceeded with complete  laser ablation with dusting setting of the stones and saw no other  significant stone fragments. At the dusting setting, multiple pieces  were fragmented. I saw no significant stone pieces. He will require  some orthostatic exercises to completely clear the stone fragments,  but they are quite small. Nothing was greater than 1 mm. At this point,  I felt that the large stone had been completely fragmented. I found no  other stones. I removed the ureteroscope under direct vision, seeing  no further significant stones. With the preexisting Double-J stent,  under cystoscopic control, I placed a 6-Kiswahili x 28 cm Double-J  ureteral stent, with a good curl in the renal pelvis and a good curl in the  bladder. The procedure was terminated. The patient was taken to the  recovery room in stable condition.   He will follow up with me next  Tuesday for stent removal.        MD Christopher Figueroa / [address]  D:  06/06/2017   00:01  T:  06/06/2017   10:38  Job #:  [id number]

## 2022-03-19 PROBLEM — N23 RENAL COLIC ON LEFT SIDE: Status: ACTIVE | Noted: 2017-06-05

## 2022-03-20 PROBLEM — N20.1 LEFT URETERAL CALCULUS: Status: ACTIVE | Noted: 2017-06-05

## 2024-02-22 ENCOUNTER — OFFICE VISIT (OUTPATIENT)
Facility: CLINIC | Age: 44
End: 2024-02-22
Payer: MEDICAID

## 2024-02-22 VITALS
BODY MASS INDEX: 44.1 KG/M2 | HEIGHT: 71 IN | DIASTOLIC BLOOD PRESSURE: 80 MMHG | HEART RATE: 77 BPM | SYSTOLIC BLOOD PRESSURE: 140 MMHG | TEMPERATURE: 97.5 F | OXYGEN SATURATION: 100 % | WEIGHT: 315 LBS

## 2024-02-22 DIAGNOSIS — E78.5 HYPERLIPIDEMIA, UNSPECIFIED HYPERLIPIDEMIA TYPE: ICD-10-CM

## 2024-02-22 DIAGNOSIS — E11.29 TYPE 2 DIABETES MELLITUS WITH OTHER DIABETIC KIDNEY COMPLICATION, WITH LONG-TERM CURRENT USE OF INSULIN (HCC): Primary | ICD-10-CM

## 2024-02-22 DIAGNOSIS — J44.9 COPD WITH HYPOXIA (HCC): ICD-10-CM

## 2024-02-22 DIAGNOSIS — Z79.4 TYPE 2 DIABETES MELLITUS WITH OTHER DIABETIC KIDNEY COMPLICATION, WITH LONG-TERM CURRENT USE OF INSULIN (HCC): Primary | ICD-10-CM

## 2024-02-22 DIAGNOSIS — Z99.81 ON HOME OXYGEN THERAPY: ICD-10-CM

## 2024-02-22 DIAGNOSIS — I10 ESSENTIAL HYPERTENSION: ICD-10-CM

## 2024-02-22 DIAGNOSIS — E66.01 MORBID OBESITY WITH BMI OF 50.0-59.9, ADULT (HCC): ICD-10-CM

## 2024-02-22 PROCEDURE — 3077F SYST BP >= 140 MM HG: CPT | Performed by: FAMILY MEDICINE

## 2024-02-22 PROCEDURE — 99214 OFFICE O/P EST MOD 30 MIN: CPT | Performed by: FAMILY MEDICINE

## 2024-02-22 PROCEDURE — 3079F DIAST BP 80-89 MM HG: CPT | Performed by: FAMILY MEDICINE

## 2024-02-22 SDOH — ECONOMIC STABILITY: FOOD INSECURITY: WITHIN THE PAST 12 MONTHS, YOU WORRIED THAT YOUR FOOD WOULD RUN OUT BEFORE YOU GOT MONEY TO BUY MORE.: NEVER TRUE

## 2024-02-22 SDOH — ECONOMIC STABILITY: FOOD INSECURITY: WITHIN THE PAST 12 MONTHS, THE FOOD YOU BOUGHT JUST DIDN'T LAST AND YOU DIDN'T HAVE MONEY TO GET MORE.: NEVER TRUE

## 2024-02-22 SDOH — ECONOMIC STABILITY: HOUSING INSECURITY
IN THE LAST 12 MONTHS, WAS THERE A TIME WHEN YOU DID NOT HAVE A STEADY PLACE TO SLEEP OR SLEPT IN A SHELTER (INCLUDING NOW)?: NO

## 2024-02-22 SDOH — ECONOMIC STABILITY: INCOME INSECURITY: HOW HARD IS IT FOR YOU TO PAY FOR THE VERY BASICS LIKE FOOD, HOUSING, MEDICAL CARE, AND HEATING?: NOT HARD AT ALL

## 2024-02-22 ASSESSMENT — PATIENT HEALTH QUESTIONNAIRE - PHQ9
SUM OF ALL RESPONSES TO PHQ9 QUESTIONS 1 & 2: 0
SUM OF ALL RESPONSES TO PHQ QUESTIONS 1-9: 0
SUM OF ALL RESPONSES TO PHQ QUESTIONS 1-9: 0
1. LITTLE INTEREST OR PLEASURE IN DOING THINGS: 0
SUM OF ALL RESPONSES TO PHQ QUESTIONS 1-9: 0
2. FEELING DOWN, DEPRESSED OR HOPELESS: 0
SUM OF ALL RESPONSES TO PHQ QUESTIONS 1-9: 0

## 2024-02-22 ASSESSMENT — ENCOUNTER SYMPTOMS
SHORTNESS OF BREATH: 1
ABDOMINAL PAIN: 0

## 2024-02-22 NOTE — PROGRESS NOTES
Garrett Rubio (:  1980) is a 43 y.o. male,New patient, here for evaluation of the following chief complaint(s):  New Patient and Establish Care         ASSESSMENT/PLAN:  {There are no diagnoses linked to this encounter. (Refresh or delete this SmartLink)}    No follow-ups on file.         Subjective   SUBJECTIVE/OBJECTIVE:  HPI    Review of Systems       Objective   Physical Exam       {Time Documentation Optional:[id number]}      An electronic signature was used to authenticate this note.    --Justine Romero MD   
\"Have you been to the ER, urgent care clinic since your last visit?  Hospitalized since your last visit?\"    NO    “Have you seen or consulted any other health care providers outside of [facility] since your last visit?”    NO    Garrett Rbuio presents today for   Chief Complaint   Patient presents with    New Patient    Establish Care       Is someone accompanying this pt? No    Is the patient using any DME equipment during OV? [employer] O2 3L    Depression Screenin/22/2024    11:33 AM   PHQ-9 Questionaire   Little interest or pleasure in doing things 0   Feeling down, depressed, or hopeless 0   PHQ-9 Total Score 0         2024    11:33 AM   PHQ Scores   PHQ2 Score 0   PHQ9 Score 0       Learning Assessment:  No question data found.    Abuse Screening:       No data to display                Fall Risk       No data to display                OPIOID RISK TOOL       No data to display                     
  Neurological:      Mental Status: He is alert and oriented to person, place, and time.                  An electronic signature was used to authenticate this note.    --Justine Romero MD

## 2024-02-23 LAB
A/G RATIO: 1.2 RATIO (ref 1.1–2.6)
ALBUMIN SERPL-MCNC: 3.8 G/DL (ref 3.5–5)
ALP BLD-CCNC: 86 U/L (ref 25–115)
ALT SERPL-CCNC: 19 U/L (ref 5–40)
ANION GAP SERPL CALCULATED.3IONS-SCNC: 13 MMOL/L (ref 3–15)
AST SERPL-CCNC: 17 U/L (ref 10–37)
BILIRUB SERPL-MCNC: 0.4 MG/DL (ref 0.2–1.2)
BUN BLDV-MCNC: 28 MG/DL (ref 6–22)
CALCIUM SERPL-MCNC: 9.2 MG/DL (ref 8.4–10.5)
CHLORIDE BLD-SCNC: 100 MMOL/L (ref 98–110)
CHOLESTEROL/HDL RATIO: 4.4 (ref 0–5)
CHOLESTEROL: 157 MG/DL (ref 110–200)
CO2: 28 MMOL/L (ref 20–32)
CREAT SERPL-MCNC: 1.6 MG/DL (ref 0.5–1.2)
ESTIMATED AVERAGE GLUCOSE: 228 MG/DL (ref 91–123)
GLOBULIN: 3.1 G/DL (ref 2–4)
GLOMERULAR FILTRATION RATE: 52.7 ML/MIN/1.73 SQ.M.
GLUCOSE: 113 MG/DL (ref 70–99)
HBA1C MFR BLD: 9.6 % (ref 4.8–5.6)
HDLC SERPL-MCNC: 36 MG/DL
LDL CHOLESTEROL CALCULATED: 85 MG/DL (ref 50–99)
LDL/HDL RATIO: 2.4
NON-HDL CHOLESTEROL: 121 MG/DL
POTASSIUM SERPL-SCNC: 4.2 MMOL/L (ref 3.5–5.5)
SODIUM BLD-SCNC: 141 MMOL/L (ref 133–145)
TOTAL PROTEIN: 6.9 G/DL (ref 6.4–8.3)
TRIGL SERPL-MCNC: 176 MG/DL (ref 40–149)
VLDLC SERPL CALC-MCNC: 35 MG/DL (ref 8–30)

## 2024-03-27 NOTE — TELEPHONE ENCOUNTER
Last Appointment:  2/22/2024  Future Appointments   Date Time Provider Department Center   5/23/2024  1:00 PM Justine Romero MD [facility]

## 2024-04-01 RX ORDER — PREGABALIN 75 MG/1
CAPSULE ORAL
Qty: 270 CAPSULE | Refills: 0 | OUTPATIENT
Start: 2024-04-01 | End: 2024-04-27

## 2024-05-15 NOTE — TELEPHONE ENCOUNTER
Orders Placed This Encounter    Insulin Pen Needle 32G X 4 MM MISC     Sig: Use 4 pen needles daily     Dispense:  400 each     Refill:  3

## 2024-05-17 DIAGNOSIS — I10 ESSENTIAL HYPERTENSION: Primary | ICD-10-CM

## 2024-05-17 RX ORDER — FUROSEMIDE 20 MG/1
20 TABLET ORAL 2 TIMES DAILY
COMMUNITY
End: 2024-05-17 | Stop reason: SDUPTHER

## 2024-05-17 RX ORDER — FUROSEMIDE 20 MG/1
20 TABLET ORAL DAILY
Qty: 90 TABLET | Refills: 1 | Status: SHIPPED | OUTPATIENT
Start: 2024-05-17

## 2024-05-17 NOTE — TELEPHONE ENCOUNTER
[facility]    Lasix 20mg once a day.    Do not see this on the current med list.

## 2024-05-18 NOTE — TELEPHONE ENCOUNTER
Orders Placed This Encounter    DISCONTD: furosemide (LASIX) 20 MG tablet     Sig: Take 1 tablet by mouth 2 times daily    furosemide (LASIX) 20 MG tablet     Sig: Take 1 tablet by mouth daily     Dispense:  90 tablet     Refill:  1

## 2024-05-29 ENCOUNTER — OFFICE VISIT (OUTPATIENT)
Facility: CLINIC | Age: 44
End: 2024-05-29

## 2024-05-29 VITALS
HEART RATE: 64 BPM | SYSTOLIC BLOOD PRESSURE: 118 MMHG | WEIGHT: 315 LBS | HEIGHT: 71 IN | OXYGEN SATURATION: 94 % | BODY MASS INDEX: 44.1 KG/M2 | DIASTOLIC BLOOD PRESSURE: 75 MMHG | TEMPERATURE: 97 F | RESPIRATION RATE: 14 BRPM

## 2024-05-29 DIAGNOSIS — J44.9 COPD WITH HYPOXIA (HCC): ICD-10-CM

## 2024-05-29 DIAGNOSIS — I10 ESSENTIAL HYPERTENSION: ICD-10-CM

## 2024-05-29 DIAGNOSIS — Z79.4 TYPE 2 DIABETES MELLITUS WITH OTHER DIABETIC KIDNEY COMPLICATION, WITH LONG-TERM CURRENT USE OF INSULIN (HCC): Primary | ICD-10-CM

## 2024-05-29 DIAGNOSIS — E78.5 HYPERLIPIDEMIA, UNSPECIFIED HYPERLIPIDEMIA TYPE: ICD-10-CM

## 2024-05-29 DIAGNOSIS — E11.29 TYPE 2 DIABETES MELLITUS WITH OTHER DIABETIC KIDNEY COMPLICATION, WITH LONG-TERM CURRENT USE OF INSULIN (HCC): Primary | ICD-10-CM

## 2024-05-29 DIAGNOSIS — E66.01 MORBID OBESITY WITH BMI OF 50.0-59.9, ADULT (HCC): ICD-10-CM

## 2024-05-29 DIAGNOSIS — Z99.81 ON HOME OXYGEN THERAPY: ICD-10-CM

## 2024-05-29 RX ORDER — ATORVASTATIN CALCIUM 20 MG/1
20 TABLET, FILM COATED ORAL EVERY EVENING
Qty: 90 TABLET | Refills: 1 | Status: SHIPPED | OUTPATIENT
Start: 2024-05-29

## 2024-05-29 RX ORDER — INSULIN GLARGINE 100 [IU]/ML
70 INJECTION, SOLUTION SUBCUTANEOUS
Qty: 10 ML | Status: CANCELLED | OUTPATIENT
Start: 2024-05-29

## 2024-05-29 RX ORDER — LISINOPRIL 2.5 MG/1
2.5 TABLET ORAL DAILY
Qty: 90 TABLET | Refills: 1 | Status: SHIPPED | OUTPATIENT
Start: 2024-05-29

## 2024-05-29 RX ORDER — CARVEDILOL 6.25 MG/1
6.25 TABLET ORAL 2 TIMES DAILY WITH MEALS
Qty: 90 TABLET | Refills: 1 | Status: SHIPPED | OUTPATIENT
Start: 2024-05-29

## 2024-05-29 RX ORDER — HYDRALAZINE HYDROCHLORIDE 100 MG/1
100 TABLET, FILM COATED ORAL 2 TIMES DAILY
Qty: 90 TABLET | Refills: 1 | Status: SHIPPED | OUTPATIENT
Start: 2024-05-29

## 2024-05-29 RX ORDER — INSULIN GLARGINE 100 [IU]/ML
75 INJECTION, SOLUTION SUBCUTANEOUS NIGHTLY
Qty: 18 ADJUSTABLE DOSE PRE-FILLED PEN SYRINGE | Refills: 1 | Status: SHIPPED | OUTPATIENT
Start: 2024-05-29 | End: 2024-11-25

## 2024-05-29 ASSESSMENT — ENCOUNTER SYMPTOMS
ABDOMINAL PAIN: 0
SHORTNESS OF BREATH: 1

## 2024-05-31 ENCOUNTER — OFFICE VISIT (OUTPATIENT)
Age: 44
End: 2024-05-31

## 2024-05-31 VITALS — HEIGHT: 71 IN | BODY MASS INDEX: 44.1 KG/M2 | WEIGHT: 315 LBS

## 2024-05-31 DIAGNOSIS — M19.032 PRIMARY OSTEOARTHRITIS OF LEFT WRIST: Primary | ICD-10-CM

## 2024-05-31 DIAGNOSIS — M67.432 GANGLION CYST OF VOLAR ASPECT OF LEFT WRIST: ICD-10-CM

## 2024-05-31 NOTE — PROGRESS NOTES
Garrett Rubio is a 43 y.o. male right handed individual, not currently working.  Worker's Compensation and legal considerations: none    Chief Complaint   Patient presents with    Wrist Pain     left     Pain Score:   5    HPI: Patient presents today with concerns of a bump on his left wrist.  He reports to be occasionally painful.    Date of onset: Indeterminate  Injury: No  Prior Treatment:  No    ROS: Review of Systems - General ROS: negative except HPI    Past Medical History:   Diagnosis Date    Acute kidney injury (HCC)     Anemia     chronic microcytic.    Atrial fibrillation with RVR (HCC)     Chronic cough     Chronic diastolic (congestive) heart failure (HCC)     CKD (chronic kidney disease), stage II     Diabetes mellitus (HCC)     Essential hypertension, malignant     Gastritis and duodenitis     GERD (gastroesophageal reflux disease)     Head ache     History of MRSA infection     Hyperlipidemia     Hypertension     Immunosuppression (HCC)     Kidney stone     Left ureteral calculus     Morbid obesity (HCC)     Nocturia     SHIREEN on CPAP     Pulmonary embolus (HCC)     Renal calculi     Retained ureteral stent     Unspecified essential hypertension     Ureteral stone        Past Surgical History:   Procedure Laterality Date    COLONOSCOPY N/A 4/27/2021    DIAGNOSTIC COLONOSCOPY w random colon bx, cold  bx polypectomy performed by Kameron Krause MD at [facility] ENDOSCOPY    CYSTOURETHROSCOPY      IR BRONCHOSCOPY      KNEE ARTHROSCOPY      OTHER SURGICAL HISTORY      Thoracoscopy - multiple.      [place] UNLISTED PROCEDURE CARDIAC SURGERY  2012    MASS REMOVED FROM HEART    UROLOGICAL [place]  [place]    [facility]  Cystoscopy, with left retrograde ureteropyelogram and left ureteroscopy, with holmium laser lithotripsyand left Double-J ureteral stent placement. Dr. Hernandez/      UROLOGICAL SURGERY Left 04/23/2017    [facility].  Cysto ih stent.  Dr. Lopez.          Current Outpatient Medications   Medication Sig

## 2024-06-07 NOTE — TELEPHONE ENCOUNTER
[facility].  States the pharmacy has been trying to reach us since last Friday.    amLODIPine (NORVASC) 10 MG tablet [[id number]]    Order Details  Takes once a day.    Last Appointment:  5/29/2024  Future Appointments   Date Time Provider Department Center   9/6/2024 11:30 AM Justine Romero MD [facility]

## 2024-06-12 RX ORDER — AMLODIPINE BESYLATE 10 MG/1
10 TABLET ORAL DAILY
Qty: 90 TABLET | Refills: 0 | Status: SHIPPED | OUTPATIENT
Start: 2024-06-12

## 2024-09-12 RX ORDER — AMLODIPINE BESYLATE 10 MG/1
10 TABLET ORAL DAILY
Qty: 90 TABLET | Refills: 1 | Status: SHIPPED | OUTPATIENT
Start: 2024-09-12

## 2024-09-29 RX ORDER — INSULIN LISPRO 100 [IU]/ML
INJECTION, SOLUTION INTRAVENOUS; SUBCUTANEOUS
Qty: 45 ML | Refills: 0 | OUTPATIENT
Start: 2024-09-29

## 2024-10-02 DIAGNOSIS — Z79.4 TYPE 2 DIABETES MELLITUS WITH OTHER DIABETIC KIDNEY COMPLICATION, WITH LONG-TERM CURRENT USE OF INSULIN (HCC): ICD-10-CM

## 2024-10-02 DIAGNOSIS — E11.29 TYPE 2 DIABETES MELLITUS WITH OTHER DIABETIC KIDNEY COMPLICATION, WITH LONG-TERM CURRENT USE OF INSULIN (HCC): ICD-10-CM

## 2024-10-02 NOTE — TELEPHONE ENCOUNTER
Last Appointment:  5/29/2024  Future Appointments   Date Time Provider Department Center   10/14/2024  3:15 PM Justine Romero MD [facility] DE[facility]

## 2024-10-03 RX ORDER — INSULIN LISPRO 100 [IU]/ML
15 INJECTION, SOLUTION INTRAVENOUS; SUBCUTANEOUS
Qty: 45 ML | Refills: 3 | Status: SHIPPED | OUTPATIENT
Start: 2024-10-03

## 2024-10-03 RX ORDER — INSULIN GLARGINE 100 [IU]/ML
75 INJECTION, SOLUTION SUBCUTANEOUS NIGHTLY
Qty: 18 ADJUSTABLE DOSE PRE-FILLED PEN SYRINGE | Refills: 1 | Status: SHIPPED | OUTPATIENT
Start: 2024-10-03 | End: 2025-04-01

## 2024-10-03 NOTE — TELEPHONE ENCOUNTER
Orders Placed This Encounter    insulin lispro (HUMALOG,ADMELOG) 100 UNIT/ML SOLN injection vial     Sig: Inject 15 Units into the skin 3 times daily (with meals)     Dispense:  45 mL     Refill:  3    insulin glargine (LANTUS SOLOSTAR) 100 UNIT/ML injection pen     Sig: Inject 75 Units into the skin nightly     Dispense:  18 Adjustable Dose Pre-filled Pen Syringe     Refill:  1

## 2024-10-14 ENCOUNTER — HOSPITAL ENCOUNTER (OUTPATIENT)
Facility: HOSPITAL | Age: 44
Setting detail: SPECIMEN
Discharge: HOME | End: 2024-10-17

## 2024-10-14 ENCOUNTER — OFFICE VISIT (OUTPATIENT)
Facility: CLINIC | Age: 44
End: 2024-10-14
Payer: MEDICAID

## 2024-10-14 VITALS
BODY MASS INDEX: 44.1 KG/M2 | HEIGHT: 71 IN | OXYGEN SATURATION: 97 % | DIASTOLIC BLOOD PRESSURE: 81 MMHG | TEMPERATURE: 97.7 F | WEIGHT: 315 LBS | RESPIRATION RATE: 15 BRPM | SYSTOLIC BLOOD PRESSURE: 138 MMHG | HEART RATE: 80 BPM

## 2024-10-14 DIAGNOSIS — R10.12 LEFT UPPER QUADRANT ABDOMINAL PAIN: Primary | ICD-10-CM

## 2024-10-14 DIAGNOSIS — E78.5 HYPERLIPIDEMIA, UNSPECIFIED HYPERLIPIDEMIA TYPE: ICD-10-CM

## 2024-10-14 DIAGNOSIS — Z79.4 TYPE 2 DIABETES MELLITUS WITH OTHER DIABETIC KIDNEY COMPLICATION, WITH LONG-TERM CURRENT USE OF INSULIN (HCC): ICD-10-CM

## 2024-10-14 DIAGNOSIS — K21.9 GASTROESOPHAGEAL REFLUX DISEASE, UNSPECIFIED WHETHER ESOPHAGITIS PRESENT: ICD-10-CM

## 2024-10-14 DIAGNOSIS — I10 ESSENTIAL HYPERTENSION: ICD-10-CM

## 2024-10-14 DIAGNOSIS — J44.9 COPD WITH HYPOXIA (HCC): ICD-10-CM

## 2024-10-14 DIAGNOSIS — E66.01 MORBID OBESITY WITH BMI OF 50.0-59.9, ADULT: ICD-10-CM

## 2024-10-14 DIAGNOSIS — Z99.81 ON HOME OXYGEN THERAPY: ICD-10-CM

## 2024-10-14 DIAGNOSIS — E11.29 TYPE 2 DIABETES MELLITUS WITH OTHER DIABETIC KIDNEY COMPLICATION, WITH LONG-TERM CURRENT USE OF INSULIN (HCC): ICD-10-CM

## 2024-10-14 PROCEDURE — 99214 OFFICE O/P EST MOD 30 MIN: CPT | Performed by: FAMILY MEDICINE

## 2024-10-14 PROCEDURE — 3079F DIAST BP 80-89 MM HG: CPT | Performed by: FAMILY MEDICINE

## 2024-10-14 PROCEDURE — 99001 SPECIMEN HANDLING PT-LAB: CPT

## 2024-10-14 PROCEDURE — 3046F HEMOGLOBIN A1C LEVEL >9.0%: CPT | Performed by: FAMILY MEDICINE

## 2024-10-14 PROCEDURE — 3075F SYST BP GE 130 - 139MM HG: CPT | Performed by: FAMILY MEDICINE

## 2024-10-14 RX ORDER — OMEPRAZOLE 40 MG/1
40 CAPSULE, DELAYED RELEASE ORAL DAILY
Qty: 90 CAPSULE | Refills: 1 | Status: SHIPPED | OUTPATIENT
Start: 2024-10-14

## 2024-10-14 RX ORDER — FUROSEMIDE 20 MG
20 TABLET ORAL DAILY
Qty: 90 TABLET | Refills: 1 | Status: SHIPPED | OUTPATIENT
Start: 2024-10-14

## 2024-10-14 RX ORDER — HYDRALAZINE HYDROCHLORIDE 100 MG/1
100 TABLET, FILM COATED ORAL 2 TIMES DAILY
Qty: 90 TABLET | Refills: 1 | Status: SHIPPED | OUTPATIENT
Start: 2024-10-14

## 2024-10-14 RX ORDER — CARVEDILOL 6.25 MG/1
6.25 TABLET ORAL 2 TIMES DAILY WITH MEALS
Qty: 90 TABLET | Refills: 1 | Status: SHIPPED | OUTPATIENT
Start: 2024-10-14

## 2024-10-14 SDOH — ECONOMIC STABILITY: FOOD INSECURITY: WITHIN THE PAST 12 MONTHS, YOU WORRIED THAT YOUR FOOD WOULD RUN OUT BEFORE YOU GOT MONEY TO BUY MORE.: NEVER TRUE

## 2024-10-14 SDOH — ECONOMIC STABILITY: INCOME INSECURITY: HOW HARD IS IT FOR YOU TO PAY FOR THE VERY BASICS LIKE FOOD, HOUSING, MEDICAL CARE, AND HEATING?: NOT HARD AT ALL

## 2024-10-14 SDOH — ECONOMIC STABILITY: FOOD INSECURITY: WITHIN THE PAST 12 MONTHS, THE FOOD YOU BOUGHT JUST DIDN'T LAST AND YOU DIDN'T HAVE MONEY TO GET MORE.: NEVER TRUE

## 2024-10-14 ASSESSMENT — PATIENT HEALTH QUESTIONNAIRE - PHQ9
SUM OF ALL RESPONSES TO PHQ QUESTIONS 1-9: 0
SUM OF ALL RESPONSES TO PHQ9 QUESTIONS 1 & 2: 0
2. FEELING DOWN, DEPRESSED OR HOPELESS: NOT AT ALL
SUM OF ALL RESPONSES TO PHQ QUESTIONS 1-9: 0
SUM OF ALL RESPONSES TO PHQ QUESTIONS 1-9: 0
1. LITTLE INTEREST OR PLEASURE IN DOING THINGS: NOT AT ALL
SUM OF ALL RESPONSES TO PHQ QUESTIONS 1-9: 0

## 2024-10-14 ASSESSMENT — ENCOUNTER SYMPTOMS
ABDOMINAL PAIN: 0
SHORTNESS OF BREATH: 1

## 2024-10-14 NOTE — PROGRESS NOTES
Garrett Rubio (:  1980) is a 43 y.o. male,Established patient, here for evaluation of the following chief complaint(s):  Follow-up, Hypertension, Diabetes, Hyperlipidemia, and Weight Management         Assessment & Plan  Left upper quadrant abdominal pain     Resolved.  Appeared to be musculoskeletal        Essential hypertension  -Chronic, at goal (stable), continue current treatment plan  -On carvedilol 6.25 mg twice a day  -Furosemide 20 mg once a day  -Hydralazine 100 mg 2 times a day  -Lisinopril 2.5 mg once daily  -Amlodipine 10 mg once a day  Orders:    carvedilol (COREG) 6.25 MG tablet; Take 1 tablet by mouth 2 times daily (with meals)    furosemide (LASIX) 20 MG tablet; Take 1 tablet by mouth daily    hydrALAZINE (APRESOLINE) 100 MG tablet; Take 1 tablet by mouth 2 times daily    Lipid Panel; Future    Gastroesophageal reflux disease, unspecified whether esophagitis present     Uncontrolled.  Last A1c was 9.6 in 2024.   Patient did not complete lab work  in May.    Stable on omeprazole 40 mg once a day  Orders:    omeprazole (PRILOSEC) 40 MG delayed release capsule; Take 1 capsule by mouth daily    Type 2 diabetes mellitus with other diabetic kidney complication, with long-term current use of insulin (HCC)     Lantus 75 units nightly and Humalog 15 units 3 times a day with meals  Orders:    Hemoglobin A1C; Future    Hyperlipidemia, unspecified hyperlipidemia type     -Stable on atorvastatin 20 mg a day  Orders:    Lipid Panel; Future    COPD with hypoxia (HCC)  -Managed by Specialist  -Managed by Dr. Cristian Ibarra       On home oxygen therapy  -Monitored by specialist- no acute findings meriting change in the plan  -Managed by Dr. Cristian Ibarra  -On 2 liters of oxygen continuously       Morbid obesity with BMI of 50.0-59.9, adult     -Recommend a low calorie diet  -Patient encouraged to exercise for 30 minutes 3 to 5 times a week.  -Recommend eating a well balanced healthy diet.

## 2024-10-14 NOTE — PROGRESS NOTES
\"Have you been to the ER, urgent care clinic since your last visit?  Hospitalized since your last visit?\"    YES - When: approximately 2 days ago.  Where and Why: [facility].    “Have you seen or consulted any other health care providers outside our system since your last visit?”    YES - When: approximately 3 months ago.  Where and Why: Podiatrist.      “Have you had a diabetic eye exam?”    NO     No diabetic eye exam on file

## 2024-10-15 LAB — SENTARA SPECIMEN COLLECTION: NORMAL

## 2024-10-16 LAB
CHOLEST SERPL-MCNC: 137 MG/DL (ref 100–199)
HBA1C MFR BLD: 10.7 % (ref 4.8–5.6)
HDLC SERPL-MCNC: 30 MG/DL
LDLC SERPL CALC-MCNC: 51 MG/DL (ref 0–99)
SPECIMEN STATUS REPORT: NORMAL
TRIGL SERPL-MCNC: 371 MG/DL (ref 0–149)
VLDLC SERPL CALC-MCNC: 56 MG/DL (ref 5–40)

## 2025-01-08 DIAGNOSIS — I10 ESSENTIAL HYPERTENSION: Primary | ICD-10-CM

## 2025-01-08 NOTE — TELEPHONE ENCOUNTER
Pharmacy request for a new 90 day refill to the [facility] pharmacy on file.      amLODIPine (NORVASC) 10 MG tablet [[id number]]    Order Details  Dose: 10 mg Route: Oral Frequency: DAILY   Dispense Quantity: 90 tablet Refills: 1          Sig: Take 1 tablet by mouth daily       Last Appointment:  10/14/2024  No future appointments.

## 2025-01-09 RX ORDER — AMLODIPINE BESYLATE 10 MG/1
10 TABLET ORAL DAILY
Qty: 90 TABLET | Refills: 1 | Status: SHIPPED | OUTPATIENT
Start: 2025-01-09

## 2025-01-10 DIAGNOSIS — I10 ESSENTIAL HYPERTENSION: ICD-10-CM

## 2025-01-10 NOTE — TELEPHONE ENCOUNTER
Orders Placed This Encounter    amLODIPine (NORVASC) 10 MG tablet     Sig: Take 1 tablet by mouth daily     Dispense:  90 tablet     Refill:  1

## 2025-01-10 NOTE — TELEPHONE ENCOUNTER
Patient is needing refill on      carvedilol (COREG) 6.25 MG tablet [[id number]]    Order Details  Dose: 6.25 mg Route: Oral Frequency: 2 TIMES DAILY WITH MEALS   Dispense Quantity: 90 tablet Refills: 1          Sig: Take 1 tablet by mouth 2 times daily (with meals)       No future appointments.

## 2025-01-12 RX ORDER — CARVEDILOL 6.25 MG/1
6.25 TABLET ORAL 2 TIMES DAILY WITH MEALS
Qty: 90 TABLET | Refills: 1 | Status: SHIPPED | OUTPATIENT
Start: 2025-01-12

## 2025-01-14 DIAGNOSIS — I10 ESSENTIAL HYPERTENSION: ICD-10-CM

## 2025-01-14 NOTE — TELEPHONE ENCOUNTER
Patient is calling to have the following medications refilled:    hydrALAZINE (APRESOLINE) 100 MG tablet    No future appointments.

## 2025-01-15 RX ORDER — HYDRALAZINE HYDROCHLORIDE 100 MG/1
100 TABLET, FILM COATED ORAL 2 TIMES DAILY
Qty: 180 TABLET | Refills: 0 | Status: SHIPPED | OUTPATIENT
Start: 2025-01-15

## 2025-01-15 NOTE — TELEPHONE ENCOUNTER
Orders Placed This Encounter    hydrALAZINE (APRESOLINE) 100 MG tablet     Sig: Take 1 tablet by mouth 2 times daily     Dispense:  180 tablet     Refill:  0     Last refill, Need to see MD

## 2025-02-10 DIAGNOSIS — E11.29 TYPE 2 DIABETES MELLITUS WITH OTHER DIABETIC KIDNEY COMPLICATION, WITH LONG-TERM CURRENT USE OF INSULIN (HCC): ICD-10-CM

## 2025-02-10 DIAGNOSIS — Z79.4 TYPE 2 DIABETES MELLITUS WITH OTHER DIABETIC KIDNEY COMPLICATION, WITH LONG-TERM CURRENT USE OF INSULIN (HCC): ICD-10-CM

## 2025-02-10 NOTE — TELEPHONE ENCOUNTER
Mr. Rubio is requesting refills of:    Requested Prescriptions     insulin glargine (LANTUS SOLOSTAR) 100 UNIT/ML injection pen        to be sent to   [facility] [address] [place] [address] [address] [place] [address] - P [phone] - F [phone]  [address] [facility] [address]  Phone: [phone] Fax: [phone]  .     LAST OFFICE VISIT:  10/14/2024     UPCOMING APPOINTMENT(S):  No future appointments.    Patient offered an appointment?  N/A  How much medication does the patient have on hand? 0    Provided notified

## 2025-02-12 RX ORDER — INSULIN GLARGINE 100 [IU]/ML
75 INJECTION, SOLUTION SUBCUTANEOUS NIGHTLY
Qty: 18 ADJUSTABLE DOSE PRE-FILLED PEN SYRINGE | Refills: 0 | Status: SHIPPED | OUTPATIENT
Start: 2025-02-12 | End: 2025-08-11

## 2025-02-13 NOTE — TELEPHONE ENCOUNTER
Orders Placed This Encounter    insulin glargine (LANTUS SOLOSTAR) 100 UNIT/ML injection pen     Sig: Inject 75 Units into the skin nightly     Dispense:  18 Adjustable Dose Pre-filled Pen Syringe     Refill:  0     Last refill. Needs to see PCP.

## 2025-02-21 DIAGNOSIS — E78.5 HYPERLIPIDEMIA, UNSPECIFIED HYPERLIPIDEMIA TYPE: ICD-10-CM

## 2025-02-21 RX ORDER — ATORVASTATIN CALCIUM 20 MG/1
20 TABLET, FILM COATED ORAL EVERY EVENING
Qty: 90 TABLET | Refills: 0 | Status: SHIPPED | OUTPATIENT
Start: 2025-02-21

## 2025-02-21 NOTE — TELEPHONE ENCOUNTER
Last Appointment:  10/14/2024  No future appointments.     Appointment reminder sent via [facility].  Return in about 3 months (around 1/14/2025) for HTN, DM, HLD, copd, obesity, OV extended.

## 2025-02-22 NOTE — TELEPHONE ENCOUNTER
Orders Placed This Encounter    atorvastatin (LIPITOR) 20 MG tablet     Sig: Take 1 tablet by mouth every evening     Dispense:  90 tablet     Refill:  0     Last refill.  Need to see MD.

## 2025-03-13 ENCOUNTER — HOSPITAL ENCOUNTER (OUTPATIENT)
Facility: HOSPITAL | Age: 45
Setting detail: SPECIMEN
Discharge: HOME | End: 2025-03-16

## 2025-03-13 ENCOUNTER — OFFICE VISIT (OUTPATIENT)
Facility: CLINIC | Age: 45
End: 2025-03-13
Payer: MEDICAID

## 2025-03-13 VITALS
OXYGEN SATURATION: 93 % | HEART RATE: 78 BPM | RESPIRATION RATE: 16 BRPM | TEMPERATURE: 97.2 F | BODY MASS INDEX: 44.1 KG/M2 | WEIGHT: 315 LBS | SYSTOLIC BLOOD PRESSURE: 162 MMHG | DIASTOLIC BLOOD PRESSURE: 90 MMHG | HEIGHT: 71 IN

## 2025-03-13 DIAGNOSIS — K21.9 GASTROESOPHAGEAL REFLUX DISEASE, UNSPECIFIED WHETHER ESOPHAGITIS PRESENT: ICD-10-CM

## 2025-03-13 DIAGNOSIS — Z28.21 FLU VACCINE REFUSED: ICD-10-CM

## 2025-03-13 DIAGNOSIS — E11.29 TYPE 2 DIABETES MELLITUS WITH OTHER DIABETIC KIDNEY COMPLICATION, WITH LONG-TERM CURRENT USE OF INSULIN (HCC): Primary | ICD-10-CM

## 2025-03-13 DIAGNOSIS — E78.5 HYPERLIPIDEMIA, UNSPECIFIED HYPERLIPIDEMIA TYPE: ICD-10-CM

## 2025-03-13 DIAGNOSIS — I10 ESSENTIAL HYPERTENSION: ICD-10-CM

## 2025-03-13 DIAGNOSIS — E66.01 MORBID OBESITY WITH BMI OF 50.0-59.9, ADULT: ICD-10-CM

## 2025-03-13 DIAGNOSIS — Z28.21 23-POLYVALENT PNEUMOCOCCAL POLYSACCHARIDE VACCINE DECLINED: ICD-10-CM

## 2025-03-13 DIAGNOSIS — J44.9 COPD WITH HYPOXIA (HCC): ICD-10-CM

## 2025-03-13 DIAGNOSIS — Z99.81 ON HOME OXYGEN THERAPY: ICD-10-CM

## 2025-03-13 DIAGNOSIS — Z11.59 NEED FOR HEPATITIS C SCREENING TEST: ICD-10-CM

## 2025-03-13 DIAGNOSIS — Z79.4 TYPE 2 DIABETES MELLITUS WITH OTHER DIABETIC KIDNEY COMPLICATION, WITH LONG-TERM CURRENT USE OF INSULIN (HCC): Primary | ICD-10-CM

## 2025-03-13 LAB — SENTARA SPECIMEN COLLECTION: NORMAL

## 2025-03-13 PROCEDURE — 99001 SPECIMEN HANDLING PT-LAB: CPT

## 2025-03-13 PROCEDURE — 99214 OFFICE O/P EST MOD 30 MIN: CPT | Performed by: FAMILY MEDICINE

## 2025-03-13 PROCEDURE — 3079F DIAST BP 80-89 MM HG: CPT | Performed by: FAMILY MEDICINE

## 2025-03-13 PROCEDURE — 3077F SYST BP >= 140 MM HG: CPT | Performed by: FAMILY MEDICINE

## 2025-03-13 RX ORDER — INSULIN GLARGINE 100 [IU]/ML
75 INJECTION, SOLUTION SUBCUTANEOUS NIGHTLY
Qty: 18 ADJUSTABLE DOSE PRE-FILLED PEN SYRINGE | Refills: 5 | Status: SHIPPED | OUTPATIENT
Start: 2025-03-13 | End: 2025-09-09

## 2025-03-13 RX ORDER — LISINOPRIL 2.5 MG/1
2.5 TABLET ORAL DAILY
Qty: 90 TABLET | Refills: 1 | Status: SHIPPED | OUTPATIENT
Start: 2025-03-13

## 2025-03-13 RX ORDER — INSULIN GLARGINE 100 [IU]/ML
75 INJECTION, SOLUTION SUBCUTANEOUS NIGHTLY
Qty: 18 ADJUSTABLE DOSE PRE-FILLED PEN SYRINGE | Refills: 0 | Status: SHIPPED | OUTPATIENT
Start: 2025-03-13 | End: 2025-03-13 | Stop reason: SDUPTHER

## 2025-03-13 SDOH — ECONOMIC STABILITY: FOOD INSECURITY: WITHIN THE PAST 12 MONTHS, THE FOOD YOU BOUGHT JUST DIDN'T LAST AND YOU DIDN'T HAVE MONEY TO GET MORE.: NEVER TRUE

## 2025-03-13 SDOH — ECONOMIC STABILITY: FOOD INSECURITY: WITHIN THE PAST 12 MONTHS, YOU WORRIED THAT YOUR FOOD WOULD RUN OUT BEFORE YOU GOT MONEY TO BUY MORE.: NEVER TRUE

## 2025-03-13 ASSESSMENT — PATIENT HEALTH QUESTIONNAIRE - PHQ9
SUM OF ALL RESPONSES TO PHQ QUESTIONS 1-9: 0
2. FEELING DOWN, DEPRESSED OR HOPELESS: NOT AT ALL
1. LITTLE INTEREST OR PLEASURE IN DOING THINGS: NOT AT ALL

## 2025-03-13 ASSESSMENT — ENCOUNTER SYMPTOMS
ABDOMINAL PAIN: 0
SHORTNESS OF BREATH: 1

## 2025-03-13 NOTE — PROGRESS NOTES
Garrett Rubio (:  1980) is a 44 y.o. male,Established patient, here for evaluation of the following chief complaint(s):  Follow-up         Assessment & Plan  Type 2 diabetes mellitus with other diabetic kidney complication, with long-term current use of insulin (HCC)  -Uncontrolled.  -Starting Ozempic 0.25 mg  -Continue current meds for now   -On Lantus 75 mg nightly  -On insulin lispro 15 units 3 times daily with meals  Orders:    Hemoglobin A1C; Future     DIABETES FOOT EXAM    Lipid Panel; Future    Albumin/Creatinine Ratio, Urine; Future    Comprehensive Metabolic Panel; Future    Semaglutide,0.25 or 0.5MG/DOS, 2 MG/3ML SOPN; Inject 0.25 mg into the skin every 7 days    Semaglutide,0.25 or 0.5MG/DOS, 2 MG/3ML SOPN; Inject 0.5 mg into the skin every 7 days ( Start this dose after complete the Ozempic (Semaglutide)  0.25 mg dose)    insulin glargine (LANTUS SOLOSTAR) 100 UNIT/ML injection pen; Inject 75 Units into the skin nightly    Essential hypertension  Uncontrolled  Currently taking carvedilol 6.25 mg twice daily, hydralazine 100 mg twice daily, furosemide 20 mg once daily and lisinopril 2.5 mg once daily    Orders:    lisinopril (PRINIVIL;ZESTRIL) 2.5 MG tablet; Take 1 tablet by mouth daily    Lipid Panel; Future    Comprehensive Metabolic Panel; Future    Need for hepatitis C screening test  Orders:    Hepatitis C Antibody; Future    Hyperlipidemia, unspecified hyperlipidemia type  On atorvastatin 20 mg nightly    Orders:    Lipid Panel; Future    Comprehensive Metabolic Panel; Future    Gastroesophageal reflux disease, unspecified whether esophagitis present  Stable on omeprazole 40 mg a day         COPD with hypoxia (HCC)  Managed by pulmonary specialist         On home oxygen therapy  On 2 L of oxygen continuous         Morbid obesity with BMI of 50.0-59.9, adult  Patient needs to lose weight.  Patient is a good candidate for Ozempic.  Will start Ozempic  0.25 mg once weekly and

## 2025-03-14 ENCOUNTER — TELEPHONE (OUTPATIENT)
Facility: CLINIC | Age: 45
End: 2025-03-14

## 2025-03-14 DIAGNOSIS — I10 ESSENTIAL HYPERTENSION: Primary | ICD-10-CM

## 2025-03-14 LAB
A/G RATIO: 1.1 RATIO (ref 1.1–2.6)
ALBUMIN: 3.8 G/DL (ref 3.5–5)
ALP BLD-CCNC: 88 U/L (ref 25–115)
ALT SERPL-CCNC: 13 U/L (ref 5–40)
ANION GAP SERPL CALCULATED.3IONS-SCNC: 10 MMOL/L (ref 3–15)
AST SERPL-CCNC: 12 U/L (ref 10–37)
BILIRUB SERPL-MCNC: 0.4 MG/DL (ref 0.2–1.2)
BUN BLDV-MCNC: 17 MG/DL (ref 6–22)
CALCIUM SERPL-MCNC: 9.4 MG/DL (ref 8.4–10.5)
CHLORIDE BLD-SCNC: 96 MMOL/L (ref 98–110)
CHOLESTEROL, TOTAL: 149 MG/DL (ref 110–200)
CHOLESTEROL/HDL RATIO: 4.4 (ref 0–5)
CO2: 30 MMOL/L (ref 20–32)
CREAT SERPL-MCNC: 1.5 MG/DL (ref 0.5–1.2)
ESTIMATED AVERAGE GLUCOSE: 247 MG/DL (ref 91–123)
GFR, ESTIMATED: 56.4 ML/MIN/1.73 SQ.M.
GLOBULIN: 3.6 G/DL (ref 2–4)
GLUCOSE: 341 MG/DL (ref 70–99)
HBA1C MFR BLD: 10.2 % (ref 4.8–5.6)
HDLC SERPL-MCNC: 34 MG/DL
HEPATITIS C ANTIBODY: NORMAL
LDL CHOLESTEROL: 67 MG/DL (ref 50–99)
LDL/HDL RATIO: 2
NON-HDL CHOLESTEROL: 115 MG/DL
POTASSIUM SERPL-SCNC: 4.4 MMOL/L (ref 3.5–5.5)
SODIUM BLD-SCNC: 136 MMOL/L (ref 133–145)
TOTAL PROTEIN: 7.4 G/DL (ref 6.4–8.3)
TRIGL SERPL-MCNC: 238 MG/DL (ref 40–149)
VLDLC SERPL CALC-MCNC: 48 MG/DL (ref 8–30)

## 2025-03-14 NOTE — TELEPHONE ENCOUNTER
Patient is calling in because he is stating that his insurance will not cover the lisinopril (PRINIVIL;ZESTRIL) 2.5 MG tablet and that the insurance company is saying Md can prescribe him something else related to the medication

## 2025-03-19 DIAGNOSIS — E11.29 TYPE 2 DIABETES MELLITUS WITH OTHER DIABETIC KIDNEY COMPLICATION, WITH LONG-TERM CURRENT USE OF INSULIN: ICD-10-CM

## 2025-03-19 DIAGNOSIS — K21.9 GASTROESOPHAGEAL REFLUX DISEASE, UNSPECIFIED WHETHER ESOPHAGITIS PRESENT: ICD-10-CM

## 2025-03-19 DIAGNOSIS — Z79.4 TYPE 2 DIABETES MELLITUS WITH OTHER DIABETIC KIDNEY COMPLICATION, WITH LONG-TERM CURRENT USE OF INSULIN: ICD-10-CM

## 2025-03-20 RX ORDER — BENAZEPRIL HYDROCHLORIDE 10 MG/1
10 TABLET ORAL DAILY
Qty: 90 TABLET | Refills: 1 | Status: SHIPPED | OUTPATIENT
Start: 2025-03-20

## 2025-03-21 NOTE — TELEPHONE ENCOUNTER
Orders Placed This Encounter    benazepril (LOTENSIN) 10 MG tablet     Sig: Take 1 tablet by mouth daily     Dispense:  90 tablet     Refill:  1        Please contact patient and inform him that benazepril 10 mg once daily was sent to his pharmacy to replace lisinopril 2.5 mg a day.

## 2025-03-24 DIAGNOSIS — Z79.4 TYPE 2 DIABETES MELLITUS WITH OTHER DIABETIC KIDNEY COMPLICATION, WITH LONG-TERM CURRENT USE OF INSULIN: ICD-10-CM

## 2025-03-24 DIAGNOSIS — E11.29 TYPE 2 DIABETES MELLITUS WITH OTHER DIABETIC KIDNEY COMPLICATION, WITH LONG-TERM CURRENT USE OF INSULIN: ICD-10-CM

## 2025-03-24 NOTE — TELEPHONE ENCOUNTER
Last Appointment:  3/13/2025  Future Appointments   Date Time Provider Department Center   4/11/2025  1:00 PM Justine Romero MD [facility]

## 2025-03-24 NOTE — TELEPHONE ENCOUNTER
Spoke with pt in regards to medication change. Two patient identifier's verified.  Relayed the PCP's note. Pt states the the medication of concern was Ozempic not being covered by the insurance, not lisinopril. Pt states he picked up the benazepril medication started taking benazepril and lisinopril together. Pt states his BP is dropped to 122/79. Pt states he has not had any side effects he is aware of. Will notify PCP. Prior authorization attempted for Ozempic. Pending decision. Pt is made aware of this.

## 2025-03-25 NOTE — TELEPHONE ENCOUNTER
Spoke with the patient.  Informed patient to continue with benazepril 10 mg a day.  He is to stop taking the lisinopril.  As far as his prescription for Ozempic.  A prior authorization has to be placed by the medical assistants.  Will have the medical assistant to place his prior authorization as soon as possible.  Do not see any reason why he should not be approved considering that his A1c is 10.  2 and his blood sugars are not well-controlled despite the use of insulin.  Plus patient is morbidly obese with a BMI of 50.80 kg/m2.

## 2025-03-26 RX ORDER — INSULIN GLARGINE 100 [IU]/ML
75 INJECTION, SOLUTION SUBCUTANEOUS NIGHTLY
Qty: 18 ADJUSTABLE DOSE PRE-FILLED PEN SYRINGE | Refills: 5 | OUTPATIENT
Start: 2025-03-26 | End: 2025-09-22

## 2025-03-28 ENCOUNTER — TELEPHONE (OUTPATIENT)
Facility: CLINIC | Age: 45
End: 2025-03-28

## 2025-03-28 NOTE — TELEPHONE ENCOUNTER
Received a call from Garrett Rubio, 1980 in regards to patient request a call back about a pre-auth for Ozempic.     Two patient identifier's verified.  Mr. Rubio is stating that the insurance would not cover the ozempic , so he stated that Dr Romero said that she would do a pre auth for it, per the patient. Patient request a call back @ [phone].       Clinical team notified.

## 2025-03-29 NOTE — TELEPHONE ENCOUNTER
Spoke with patient and two patient identifiers were verified. Advised patient that Ozempic prior authorization was denied.     Advised patient to call insurance company to see what medications are covered then let us know so Md can order it.

## 2025-03-31 ENCOUNTER — TELEPHONE (OUTPATIENT)
Facility: CLINIC | Age: 45
End: 2025-03-31

## 2025-03-31 DIAGNOSIS — Z79.4 TYPE 2 DIABETES MELLITUS WITH OTHER DIABETIC KIDNEY COMPLICATION, WITH LONG-TERM CURRENT USE OF INSULIN: Primary | ICD-10-CM

## 2025-03-31 DIAGNOSIS — E11.29 TYPE 2 DIABETES MELLITUS WITH OTHER DIABETIC KIDNEY COMPLICATION, WITH LONG-TERM CURRENT USE OF INSULIN: Primary | ICD-10-CM

## 2025-03-31 NOTE — TELEPHONE ENCOUNTER
Patient called to let Md know that since his insurance does not cover Ozempic, he called them and his insurance stated that they will cover Trulicity or victoza.

## 2025-04-01 ENCOUNTER — PATIENT MESSAGE (OUTPATIENT)
Facility: CLINIC | Age: 45
End: 2025-04-01

## 2025-04-02 NOTE — TELEPHONE ENCOUNTER
Orders Placed This Encounter    dulaglutide (TRULICITY) 0.75 MG/0.5ML SOAJ SC injection     Sig: Inject 0.5 mLs into the skin every 7 days     Dispense:  2 mL     Refill:  0

## 2025-04-03 ENCOUNTER — RESULTS FOLLOW-UP (OUTPATIENT)
Facility: CLINIC | Age: 45
End: 2025-04-03

## 2025-04-03 DIAGNOSIS — E78.5 HYPERLIPIDEMIA, UNSPECIFIED HYPERLIPIDEMIA TYPE: Primary | ICD-10-CM

## 2025-04-03 RX ORDER — ATORVASTATIN CALCIUM 40 MG/1
40 TABLET, FILM COATED ORAL DAILY
Qty: 90 TABLET | Refills: 1 | Status: SHIPPED | OUTPATIENT
Start: 2025-04-03

## 2025-04-04 ENCOUNTER — TELEPHONE (OUTPATIENT)
Facility: CLINIC | Age: 45
End: 2025-04-04

## 2025-04-04 NOTE — TELEPHONE ENCOUNTER
Called pt to advise TRULICITY was approved by insurance and [id number] - [place], [place] - [address] - P [phone] - F [phone]  [address], [facility] [address]  Phone: [phone]  Fax: [phone]

## 2025-04-11 ENCOUNTER — OFFICE VISIT (OUTPATIENT)
Facility: CLINIC | Age: 45
End: 2025-04-11
Payer: MEDICAID

## 2025-04-11 VITALS
SYSTOLIC BLOOD PRESSURE: 123 MMHG | TEMPERATURE: 97.3 F | OXYGEN SATURATION: 93 % | DIASTOLIC BLOOD PRESSURE: 81 MMHG | WEIGHT: 315 LBS | HEIGHT: 71 IN | HEART RATE: 84 BPM | RESPIRATION RATE: 16 BRPM | BODY MASS INDEX: 44.1 KG/M2

## 2025-04-11 DIAGNOSIS — Z99.81 ON HOME OXYGEN THERAPY: ICD-10-CM

## 2025-04-11 DIAGNOSIS — Z79.4 TYPE 2 DIABETES MELLITUS WITH OTHER DIABETIC KIDNEY COMPLICATION, WITH LONG-TERM CURRENT USE OF INSULIN: Primary | ICD-10-CM

## 2025-04-11 DIAGNOSIS — I10 ESSENTIAL HYPERTENSION: ICD-10-CM

## 2025-04-11 DIAGNOSIS — E11.29 TYPE 2 DIABETES MELLITUS WITH OTHER DIABETIC KIDNEY COMPLICATION, WITH LONG-TERM CURRENT USE OF INSULIN: Primary | ICD-10-CM

## 2025-04-11 DIAGNOSIS — E66.01 MORBID OBESITY WITH BMI OF 50.0-59.9, ADULT: ICD-10-CM

## 2025-04-11 DIAGNOSIS — J44.9 COPD WITH HYPOXIA (HCC): ICD-10-CM

## 2025-04-11 DIAGNOSIS — K21.9 GASTROESOPHAGEAL REFLUX DISEASE, UNSPECIFIED WHETHER ESOPHAGITIS PRESENT: ICD-10-CM

## 2025-04-11 DIAGNOSIS — M76.62 ACHILLES TENDINITIS OF LEFT LOWER EXTREMITY: ICD-10-CM

## 2025-04-11 DIAGNOSIS — E78.5 HYPERLIPIDEMIA, UNSPECIFIED HYPERLIPIDEMIA TYPE: ICD-10-CM

## 2025-04-11 PROBLEM — E11.9 DIABETES MELLITUS (HCC): Status: ACTIVE | Noted: 2025-04-11

## 2025-04-11 PROCEDURE — 99214 OFFICE O/P EST MOD 30 MIN: CPT | Performed by: FAMILY MEDICINE

## 2025-04-11 PROCEDURE — 3074F SYST BP LT 130 MM HG: CPT | Performed by: FAMILY MEDICINE

## 2025-04-11 PROCEDURE — 3046F HEMOGLOBIN A1C LEVEL >9.0%: CPT | Performed by: FAMILY MEDICINE

## 2025-04-11 PROCEDURE — 3079F DIAST BP 80-89 MM HG: CPT | Performed by: FAMILY MEDICINE

## 2025-04-11 RX ORDER — OMEPRAZOLE 40 MG/1
40 CAPSULE, DELAYED RELEASE ORAL DAILY
Qty: 90 CAPSULE | Refills: 1 | Status: SHIPPED | OUTPATIENT
Start: 2025-04-11

## 2025-04-11 RX ORDER — LANCETS
EACH MISCELLANEOUS
Qty: 300 EACH | Refills: 3 | Status: SHIPPED | OUTPATIENT
Start: 2025-04-11

## 2025-04-11 RX ORDER — BLOOD-GLUCOSE METER
1 EACH MISCELLANEOUS DAILY
Qty: 1 KIT | Refills: 0 | Status: SHIPPED | OUTPATIENT
Start: 2025-04-11

## 2025-04-11 RX ORDER — BLOOD SUGAR DIAGNOSTIC
1 STRIP MISCELLANEOUS DAILY
Qty: 100 EACH | Refills: 3 | Status: SHIPPED | OUTPATIENT
Start: 2025-04-11

## 2025-04-11 RX ORDER — FUROSEMIDE 20 MG/1
20 TABLET ORAL DAILY
Qty: 90 TABLET | Refills: 1 | Status: SHIPPED | OUTPATIENT
Start: 2025-04-11

## 2025-04-11 RX ORDER — HYDRALAZINE HYDROCHLORIDE 100 MG/1
100 TABLET, FILM COATED ORAL 2 TIMES DAILY
Qty: 180 TABLET | Refills: 1 | Status: SHIPPED | OUTPATIENT
Start: 2025-04-11

## 2025-04-11 RX ORDER — CARVEDILOL 6.25 MG/1
6.25 TABLET ORAL 2 TIMES DAILY WITH MEALS
Qty: 90 TABLET | Refills: 1 | Status: SHIPPED | OUTPATIENT
Start: 2025-04-11

## 2025-04-11 RX ORDER — BLOOD SUGAR DIAGNOSTIC
1 STRIP MISCELLANEOUS 4 TIMES DAILY
Qty: 360 EACH | Refills: 3 | Status: SHIPPED | OUTPATIENT
Start: 2025-04-11

## 2025-04-11 RX ORDER — PREGABALIN 100 MG/1
CAPSULE ORAL 3 TIMES DAILY
Qty: 60 CAPSULE | Status: CANCELLED | OUTPATIENT
Start: 2025-04-11

## 2025-04-11 SDOH — ECONOMIC STABILITY: FOOD INSECURITY: WITHIN THE PAST 12 MONTHS, THE FOOD YOU BOUGHT JUST DIDN'T LAST AND YOU DIDN'T HAVE MONEY TO GET MORE.: NEVER TRUE

## 2025-04-11 SDOH — ECONOMIC STABILITY: FOOD INSECURITY: WITHIN THE PAST 12 MONTHS, YOU WORRIED THAT YOUR FOOD WOULD RUN OUT BEFORE YOU GOT MONEY TO BUY MORE.: NEVER TRUE

## 2025-04-11 ASSESSMENT — PATIENT HEALTH QUESTIONNAIRE - PHQ9
SUM OF ALL RESPONSES TO PHQ QUESTIONS 1-9: 0
1. LITTLE INTEREST OR PLEASURE IN DOING THINGS: NOT AT ALL
SUM OF ALL RESPONSES TO PHQ QUESTIONS 1-9: 0
2. FEELING DOWN, DEPRESSED OR HOPELESS: NOT AT ALL

## 2025-04-11 ASSESSMENT — ENCOUNTER SYMPTOMS
ABDOMINAL PAIN: 0
SHORTNESS OF BREATH: 1

## 2025-04-11 NOTE — PROGRESS NOTES
Garrett Rubio (:  1980) is a 44 y.o. male,Established patient, here for evaluation of the following chief complaint(s):  Follow-up, Diabetes, and Weight Management         Assessment & Plan  Type 2 diabetes mellitus with other diabetic kidney complication, with long-term current use of insulin  -Uncontrolled.  Last A1c was 10.2.  Now receiving Ozempic.  Currently started 0.25 mg weekly dose on 2025.  Receiving injections each Wednesday.    Orders:    ONE TOUCH ULTRASOFT LANCETS MISC; Use 1 lancet to test BS 3 times a day    Blood Glucose Monitoring Suppl (ONE TOUCH ULTRA 2) w/Device KIT; 1 kit by Does not apply route daily    blood glucose test strips (ONETOUCH ULTRA TEST) strip; 1 each by In Vitro route daily 3 times a day to test blood sugar    Insulin Pen Needle (PEN NEEDLES) 31G X 6 MM MISC; 1 each by Does not apply route 4 times daily    Essential hypertension  -Well-controlled  -Currently taking carvedilol 6.25 mg twice daily, hydralazine 100 mg twice daily, furosemide 20 mg once daily and lisinopril 2.5 mg once daily.  Orders:    hydrALAZINE (APRESOLINE) 100 MG tablet; Take 1 tablet by mouth 2 times daily    carvedilol (COREG) 6.25 MG tablet; Take 1 tablet by mouth 2 times daily (with meals)    furosemide (LASIX) 20 MG tablet; Take 1 tablet by mouth daily    Hyperlipidemia, unspecified hyperlipidemia type  -On atorvastatin 20 mg nightly       Gastroesophageal reflux disease, unspecified whether esophagitis present  -Stable on omeprazole 400 mg     Orders:    omeprazole (PRILOSEC) 40 MG delayed release capsule; Take 1 capsule by mouth daily    Achilles tendinitis of left lower extremity  -Tylenol arthritis 3 times a day   -Advised to pace a heel cup inside shoe to take strain off of Achilles tendon while walking.       COPD with hypoxia (HCC)  Managed by pulmonary specialist       On home oxygen therapy  On 2 L of oxygen continuous       Morbid obesity with BMI of 50.0-59.9,

## 2025-04-12 NOTE — ASSESSMENT & PLAN NOTE
-Recommend a low calorie diet  -Patient encouraged to exercise for 30 minutes 3 to 5 times a week.    -Recommend eating a well balanced healthy diet. (Consistent of lean meats, lots of fruits, vegetables and whole grains).    -Limit processed foods.   -Drink 32 to 64 ounces of fluid each day  -Eat 2 to 3 g of fiber each day

## 2025-04-12 NOTE — ASSESSMENT & PLAN NOTE
-Well-controlled  -Currently taking carvedilol 6.25 mg twice daily, hydralazine 100 mg twice daily, furosemide 20 mg once daily and lisinopril 2.5 mg once daily.  Orders:    hydrALAZINE (APRESOLINE) 100 MG tablet; Take 1 tablet by mouth 2 times daily    carvedilol (COREG) 6.25 MG tablet; Take 1 tablet by mouth 2 times daily (with meals)    furosemide (LASIX) 20 MG tablet; Take 1 tablet by mouth daily

## 2025-04-12 NOTE — ASSESSMENT & PLAN NOTE
Stable on omeprazole 400 mg     Orders:    omeprazole (PRILOSEC) 40 MG delayed release capsule; Take 1 capsule by mouth daily

## 2025-04-14 ENCOUNTER — TELEPHONE (OUTPATIENT)
Facility: CLINIC | Age: 45
End: 2025-04-14

## 2025-04-14 RX ORDER — LANCETS 30 GAUGE
EACH MISCELLANEOUS
Qty: 100 EACH | Refills: 4 | Status: SHIPPED | OUTPATIENT
Start: 2025-04-14

## 2025-04-14 NOTE — TELEPHONE ENCOUNTER
Mr. Rubio is requesting refills of:    One Touch Ultra 2 Lancets Misc         to be sent to   [id number] - [place], [place] - [address] - P [phone] - F [phone]  [address]  [place] [address]  Phone: [phone] Fax: [phone]  .     LAST OFFICE VISIT:  4/11/2025     UPCOMING APPOINTMENT(S):  Future Appointments   Date Time Provider Department Center   5/5/2025 10:45 AM Justine Romero MD [facility]       Patient offered an appointment?  N/A  How much medication does the patient have on hand? 0    Provided notified

## 2025-04-14 NOTE — TELEPHONE ENCOUNTER
Surescripts reviewed: pt received one touch delica plus. Pt states this was the incorrect lancets and request a refill of one touch ultra 2 lancets

## 2025-04-15 NOTE — TELEPHONE ENCOUNTER
Orders Placed This Encounter    OneTouch UltraSoft 2 Lancets MISC     Si each by In Vitro route daily 3 times a day to test blood sugar     Dispense:  100 each     Refill:  4     Please dispense onetouch ultra 2 lancets

## 2025-04-16 DIAGNOSIS — Z79.4 TYPE 2 DIABETES MELLITUS WITH OTHER DIABETIC KIDNEY COMPLICATION, WITH LONG-TERM CURRENT USE OF INSULIN: ICD-10-CM

## 2025-04-16 DIAGNOSIS — E11.29 TYPE 2 DIABETES MELLITUS WITH OTHER DIABETIC KIDNEY COMPLICATION, WITH LONG-TERM CURRENT USE OF INSULIN: ICD-10-CM

## 2025-04-16 NOTE — TELEPHONE ENCOUNTER
Patient is needing refill on      insulin lispro (HUMALOG,ADMELOG) 100 UNIT/ML SOLN injection vial [[id number]]    Order Details  Dose: 15 Units Route: SubCUTAneous Frequency: 3 TIMES DAILY WITH MEALS   Dispense Quantity: 45 mL Refills: 3          Sig: Inject 15 Units into the skin 3 times daily (with meals)     Future Appointments   Date Time Provider Department Center   5/5/2025 10:45 AM Justine Romero MD [facility]

## 2025-04-17 DIAGNOSIS — Z79.4 TYPE 2 DIABETES MELLITUS WITH OTHER DIABETIC KIDNEY COMPLICATION, WITH LONG-TERM CURRENT USE OF INSULIN: Primary | ICD-10-CM

## 2025-04-17 DIAGNOSIS — E11.29 TYPE 2 DIABETES MELLITUS WITH OTHER DIABETIC KIDNEY COMPLICATION, WITH LONG-TERM CURRENT USE OF INSULIN: Primary | ICD-10-CM

## 2025-04-17 RX ORDER — INSULIN LISPRO 100 [IU]/ML
15 INJECTION, SOLUTION INTRAVENOUS; SUBCUTANEOUS
Qty: 45 ML | Refills: 0 | Status: SHIPPED | OUTPATIENT
Start: 2025-04-17

## 2025-05-01 ENCOUNTER — TELEPHONE (OUTPATIENT)
Facility: CLINIC | Age: 45
End: 2025-05-01

## 2025-05-01 DIAGNOSIS — Z79.4 TYPE 2 DIABETES MELLITUS WITH OTHER DIABETIC KIDNEY COMPLICATION, WITH LONG-TERM CURRENT USE OF INSULIN (HCC): ICD-10-CM

## 2025-05-01 DIAGNOSIS — E11.29 TYPE 2 DIABETES MELLITUS WITH OTHER DIABETIC KIDNEY COMPLICATION, WITH LONG-TERM CURRENT USE OF INSULIN (HCC): ICD-10-CM

## 2025-05-01 RX ORDER — INSULIN LISPRO 100 [IU]/ML
15 INJECTION, SOLUTION INTRAVENOUS; SUBCUTANEOUS
Qty: 45 ML | Refills: 5 | Status: SHIPPED | OUTPATIENT
Start: 2025-05-01

## 2025-05-01 RX ORDER — INSULIN LISPRO 100 [IU]/ML
INJECTION, SOLUTION INTRAVENOUS; SUBCUTANEOUS
Qty: 45 ML | Refills: 5 | Status: SHIPPED | OUTPATIENT
Start: 2025-05-01

## 2025-05-01 RX ORDER — BLOOD SUGAR DIAGNOSTIC
STRIP MISCELLANEOUS
Qty: 200 EACH | Refills: 5 | Status: SHIPPED | OUTPATIENT
Start: 2025-05-01

## 2025-05-01 NOTE — TELEPHONE ENCOUNTER
Orders Placed This Encounter    insulin lispro (HUMALOG,ADMELOG) 100 UNIT/ML SOLN injection vial     Sig: Inject 15 Units into the skin 3 times daily (with meals)     Dispense:  45 mL     Refill:  5

## 2025-05-01 NOTE — TELEPHONE ENCOUNTER
Last Appointment:  4/11/2025  Future Appointments   Date Time Provider Department Center   5/5/2025 10:45 AM Justine Romero MD [facility]

## 2025-05-01 NOTE — TELEPHONE ENCOUNTER
Please see message below from Mckenzie.  Also the incorrect test strips were sent last month, new one pended.

## 2025-05-01 NOTE — TELEPHONE ENCOUNTER
Patient is calling in stating he wants to Humalog PENS, not the vial.  States it was ordered incorrectly at his last visit.

## 2025-05-01 NOTE — TELEPHONE ENCOUNTER
Orders Placed This Encounter    insulin lispro (HUMALOG,ADMELOG) 100 UNIT/ML SOLN injection vial     Sig: Inject 15 Units into the skin 3 times daily (with meals)     Dispense:  45 mL     Refill:  5    insulin lispro, 1 Unit Dial, (HUMALOG KWIKPEN) 100 UNIT/ML SOPN     Sig: Inject 15 units into the skin 3 times daily (with meals)     Dispense:  45 mL     Refill:  5    blood glucose test strips (ONETOUCH ULTRA TEST) strip     Sig: Test blood sugar 4 times daily  One Touch Ultra 2 Test Strips     Dispense:  200 each     Refill:  5

## 2025-05-01 NOTE — TELEPHONE ENCOUNTER
Patient request for refill to the [facility] pharmacy on file.      dulaglutide (TRULICITY) 0.75 MG/0.5ML SOAJ SC injection [[id number]]    Order Details  Dose: 0.75 mg Route: SubCUTAneous Frequency: EVERY 7 DAYS   Dispense Quantity: 2 mL Refills: 0          Sig: Inject 0.5 mLs into the skin every 7 days         Start Date: 04/01/25 End Date: --   Written Date: 04/01/25 Expiration Date: 04/01/26       Associated Diagnoses: Type 2 diabetes mellitus with other diabetic kidney complication, with long-term current use of insulin (HCC) [E11.29, Z79.4]

## 2025-05-05 ENCOUNTER — OFFICE VISIT (OUTPATIENT)
Facility: CLINIC | Age: 45
End: 2025-05-05
Payer: MEDICAID

## 2025-05-05 VITALS
TEMPERATURE: 97.3 F | BODY MASS INDEX: 44.1 KG/M2 | HEIGHT: 71 IN | WEIGHT: 315 LBS | DIASTOLIC BLOOD PRESSURE: 80 MMHG | SYSTOLIC BLOOD PRESSURE: 138 MMHG | RESPIRATION RATE: 17 BRPM | OXYGEN SATURATION: 95 % | HEART RATE: 71 BPM

## 2025-05-05 DIAGNOSIS — J44.9 COPD WITH HYPOXIA (HCC): ICD-10-CM

## 2025-05-05 DIAGNOSIS — E66.01 MORBID OBESITY WITH BMI OF 50.0-59.9, ADULT (HCC): ICD-10-CM

## 2025-05-05 DIAGNOSIS — Z99.81 ON HOME OXYGEN THERAPY: ICD-10-CM

## 2025-05-05 DIAGNOSIS — E11.29 TYPE 2 DIABETES MELLITUS WITH OTHER DIABETIC KIDNEY COMPLICATION, WITH LONG-TERM CURRENT USE OF INSULIN (HCC): Primary | ICD-10-CM

## 2025-05-05 DIAGNOSIS — Z79.4 TYPE 2 DIABETES MELLITUS WITH OTHER DIABETIC KIDNEY COMPLICATION, WITH LONG-TERM CURRENT USE OF INSULIN (HCC): Primary | ICD-10-CM

## 2025-05-05 DIAGNOSIS — I10 ESSENTIAL HYPERTENSION: ICD-10-CM

## 2025-05-05 PROCEDURE — 3078F DIAST BP <80 MM HG: CPT | Performed by: FAMILY MEDICINE

## 2025-05-05 PROCEDURE — 3046F HEMOGLOBIN A1C LEVEL >9.0%: CPT | Performed by: FAMILY MEDICINE

## 2025-05-05 PROCEDURE — 3075F SYST BP GE 130 - 139MM HG: CPT | Performed by: FAMILY MEDICINE

## 2025-05-05 PROCEDURE — 99213 OFFICE O/P EST LOW 20 MIN: CPT | Performed by: FAMILY MEDICINE

## 2025-05-05 RX ORDER — BLOOD SUGAR DIAGNOSTIC
STRIP MISCELLANEOUS
Qty: 150 EACH | Refills: 11 | Status: SHIPPED | OUTPATIENT
Start: 2025-05-05

## 2025-05-05 RX ORDER — INSULIN LISPRO 100 [IU]/ML
INJECTION, SOLUTION INTRAVENOUS; SUBCUTANEOUS
Qty: 45 ML | Refills: 5 | Status: SHIPPED | OUTPATIENT
Start: 2025-05-05

## 2025-05-05 ASSESSMENT — ENCOUNTER SYMPTOMS
SHORTNESS OF BREATH: 1
ABDOMINAL PAIN: 0

## 2025-05-05 NOTE — ASSESSMENT & PLAN NOTE
-Last A1c was 10.2        -Increased Trulicity to 1.5 mg weekly injections       -Patient given a hard copy of his One Touch ultra testing strips so that he is able to check his blood sugars at home. (Somehow unable to E-scribed to pharmacy through [facility])  - Patient encouraged to limit his carbohydrates, starches, sweets and any soda intake.  -Encouraged any form of exercise that he can tolerate for approximately 30 minutes a day    Orders:    insulin lispro, 1 Unit Dial, (HUMALOG KWIKPEN) 100 UNIT/ML SOPN; Inject 15 units into the skin 3 times daily (with meals)    blood glucose test strips (ONETOUCH ULTRA TEST) strip; Test blood sugar 4 times daily  One Touch Ultra 2 Test Strips

## 2025-05-05 NOTE — ASSESSMENT & PLAN NOTE
Fair control.  Still not at goal.   Patient notes having back pain that is 8 out of 10.   He did take his pain meds prior to coming into office.      Will continue on current meds and doses for now.

## 2025-05-05 NOTE — PROGRESS NOTES
Have you been to the ER, urgent care clinic since your last visit?  Hospitalized since your last visit?   NO    Have you seen or consulted any other health care providers outside our system since your last visit?   NO    “Have you had a diabetic eye exam?”    NO     No diabetic eye exam on file

## 2025-05-05 NOTE — PROGRESS NOTES
Garrett Rubio (:  1980) is a 44 y.o. male,Established patient, here for evaluation of the following chief complaint(s):  Diabetes and Weight Management         Assessment & Plan  Type 2 diabetes mellitus with other diabetic kidney complication, with long-term current use of insulin ([facility])  -Last A1c was 10.2        -Increased Trulicity to 1.5 mg weekly injections       -Patient given a hard copy of his One Touch ultra testing strips so that he is able to check his blood sugars at home. (Somehow unable to E-scribed to pharmacy through [facility])  - Patient encouraged to limit his carbohydrates, starches, sweets and any soda intake.  -Encouraged any form of exercise that he can tolerate for approximately 30 minutes a day    Orders:    insulin lispro, 1 Unit Dial, (HUMALOG KWIKPEN) 100 UNIT/ML SOPN; Inject 15 units into the skin 3 times daily (with meals)    blood glucose test strips (ONETOUCH ULTRA TEST) strip; Test blood sugar 4 times daily  One Touch Ultra 2 Test Strips    Essential hypertension  Fair control.  Still not at goal.   Patient notes having back pain that is 8 out of 10.   He did take his pain meds prior to coming into office.      Will continue on current meds and doses for now.            COPD with hypoxia ([facility])  Managed by pulmonary specialist, Dr. Cristian Ibarra        On home oxygen therapy   On 2 L of oxygen continuously        Morbid obesity with BMI of 50.0-59.9, adult ([facility])  -Recommend a low calorie diet  -Patient encouraged to exercise for 30 minutes 3 to 5 times a week.    -Recommend eating a well balanced healthy diet. (Consistent of lean meats, lots of fruits, vegetables and whole grains).    -Limit processed foods.   -Drink 32 to 64 ounces of fluid each day  -Eat 2 to 3 g of fiber each day         Return in about 1 month (around 2025) for Diabetes, WEIGHT CONCERNS, HTN, OV extended.       Subjective   44-year-old male with a history significant for type 2 diabetes, chronic

## 2025-05-16 ENCOUNTER — TELEPHONE (OUTPATIENT)
Facility: CLINIC | Age: 45
End: 2025-05-16

## 2025-05-16 NOTE — TELEPHONE ENCOUNTER
Mr. Rubio is requesting refills of:    furosemide (LASIX) 20 MG tablet         to be sent to   [id number] - [place], [place] - [address] - P [phone] - F [phone]  [address] [facility] [address]  Phone: [phone] Fax: [phone]  .     LAST OFFICE VISIT:  5/5/2025     UPCOMING APPOINTMENT(S):  Future Appointments   Date Time Provider Department Center   6/9/2025 11:15 AM Justine Romero MD [facility]     Patient offered an appointment? N/A  How much medication does the patient have on hand? 0    Provided notified

## 2025-05-19 NOTE — TELEPHONE ENCOUNTER
Chart reviewed. Pt has an active prescription available. Call placed and informed the pt of this. Pt states he will follow up with the pharmacy directly.

## 2025-06-09 ENCOUNTER — OFFICE VISIT (OUTPATIENT)
Facility: CLINIC | Age: 45
End: 2025-06-09
Payer: MEDICAID

## 2025-06-09 VITALS
BODY MASS INDEX: 44.1 KG/M2 | HEART RATE: 77 BPM | DIASTOLIC BLOOD PRESSURE: 84 MMHG | OXYGEN SATURATION: 97 % | TEMPERATURE: 97 F | HEIGHT: 71 IN | SYSTOLIC BLOOD PRESSURE: 140 MMHG | WEIGHT: 315 LBS | RESPIRATION RATE: 17 BRPM

## 2025-06-09 DIAGNOSIS — E11.29 TYPE 2 DIABETES MELLITUS WITH OTHER DIABETIC KIDNEY COMPLICATION, WITH LONG-TERM CURRENT USE OF INSULIN (HCC): Primary | ICD-10-CM

## 2025-06-09 DIAGNOSIS — E66.01 MORBID OBESITY WITH BMI OF 50.0-59.9, ADULT (HCC): ICD-10-CM

## 2025-06-09 DIAGNOSIS — Z79.4 TYPE 2 DIABETES MELLITUS WITH OTHER DIABETIC KIDNEY COMPLICATION, WITH LONG-TERM CURRENT USE OF INSULIN (HCC): Primary | ICD-10-CM

## 2025-06-09 DIAGNOSIS — E78.5 HYPERLIPIDEMIA, UNSPECIFIED HYPERLIPIDEMIA TYPE: ICD-10-CM

## 2025-06-09 DIAGNOSIS — J44.9 COPD WITH HYPOXIA (HCC): ICD-10-CM

## 2025-06-09 DIAGNOSIS — I10 ESSENTIAL HYPERTENSION: ICD-10-CM

## 2025-06-09 DIAGNOSIS — Z99.81 ON HOME OXYGEN THERAPY: ICD-10-CM

## 2025-06-09 PROCEDURE — 3079F DIAST BP 80-89 MM HG: CPT | Performed by: FAMILY MEDICINE

## 2025-06-09 PROCEDURE — 99213 OFFICE O/P EST LOW 20 MIN: CPT | Performed by: FAMILY MEDICINE

## 2025-06-09 PROCEDURE — 3077F SYST BP >= 140 MM HG: CPT | Performed by: FAMILY MEDICINE

## 2025-06-09 PROCEDURE — 3046F HEMOGLOBIN A1C LEVEL >9.0%: CPT | Performed by: FAMILY MEDICINE

## 2025-06-09 SDOH — ECONOMIC STABILITY: FOOD INSECURITY: WITHIN THE PAST 12 MONTHS, YOU WORRIED THAT YOUR FOOD WOULD RUN OUT BEFORE YOU GOT MONEY TO BUY MORE.: NEVER TRUE

## 2025-06-09 SDOH — ECONOMIC STABILITY: FOOD INSECURITY: WITHIN THE PAST 12 MONTHS, THE FOOD YOU BOUGHT JUST DIDN'T LAST AND YOU DIDN'T HAVE MONEY TO GET MORE.: NEVER TRUE

## 2025-06-09 ASSESSMENT — ENCOUNTER SYMPTOMS
ABDOMINAL PAIN: 0
SHORTNESS OF BREATH: 1

## 2025-06-09 NOTE — PROGRESS NOTES
Garrett Rubio (:  1980) is a 44 y.o. male,Established patient, here for evaluation of the following chief complaint(s):  Follow-up, Hypertension, Diabetes, and Weight Management         Assessment & Plan  Type 2 diabetes mellitus with other diabetic kidney complication, with long-term current use of insulin ([facility])  -Improving blood sugars  -Increased Trulicity to 3 mg  weekly.   Patient tolerated trulicity 1.5 mg.     Also lost 12 lbs on this dose.                   Orders:    Dulaglutide 3 MG/0.5ML SOAJ; Inject 3 mg into the skin every 7 days    Essential hypertension  -Uncontrolled.  Still not at goal.  -Patient notes having back pain that is 8 out of 10.   -He did take his pain meds prior to coming into office.      -Will continue on current meds and doses for now.    -Consider adding hydralazine 10 mg at noontime if no improvement in blood pressure          COPD with hypoxia ([facility])  -Managed by pulmonary specialist, Dr. Cristian Ibarra        On home oxygen therapy  - On 2 L of oxygen continuously        Hyperlipidemia, unspecified hyperlipidemia type  -On atorvastatin 40 mg nightly       Morbid obesity with BMI of 50.0-59.9, adult ([facility])  -LOST 12 LBS IN 1 MONTH ON TRULICITY 1.5MG WEEKLY.  -Recommend a low calorie diet  -Patient encouraged to exercise for 30 minutes 3 to 5 times a week.   -Recommend eating a well balanced healthy diet. (Consistent of lean meats, lots of fruits, vegetables and whole grains).    -Limit processed foods.   -Drink 32 to 64 ounces of fluid each day  -Eat 2 to 3 g of fiber each day         Return in about 29 days (around 2025) for DM, Weight concerns (on Trulicity 3.0), recheck HTN., OV extended.       Subjective   44-year-old male with a history significant for type 2 diabetes, chronic kidney disease, hypertension, hyperlipidemia, COPD on 2 L of oxygen continuously, GERD, obstructive sleep apnea, morbid obesity and kidney stones.     Patient presents today for

## 2025-06-09 NOTE — ASSESSMENT & PLAN NOTE
-Improving blood sugars  -Increased Trulicity to 3 mg  weekly.   Patient tolerated trulicity 1.5 mg.     Also lost 12 lbs on this dose.                   Orders:    Dulaglutide 3 MG/0.5ML SOAJ; Inject 3 mg into the skin every 7 days     51.3

## 2025-06-09 NOTE — ASSESSMENT & PLAN NOTE
-LOST 12 LBS IN 1 MONTH ON TRULICITY 1.5MG WEEKLY.  -Recommend a low calorie diet  -Patient encouraged to exercise for 30 minutes 3 to 5 times a week.   -Recommend eating a well balanced healthy diet. (Consistent of lean meats, lots of fruits, vegetables and whole grains).    -Limit processed foods.   -Drink 32 to 64 ounces of fluid each day  -Eat 2 to 3 g of fiber each day

## 2025-06-09 NOTE — ASSESSMENT & PLAN NOTE
-Uncontrolled.  Still not at goal.  -Patient notes having back pain that is 8 out of 10.   -He did take his pain meds prior to coming into office.      -Will continue on current meds and doses for now.    -Consider adding hydralazine 10 mg at noontime if no improvement in blood pressure

## 2025-06-10 DIAGNOSIS — Z79.4 TYPE 2 DIABETES MELLITUS WITH OTHER DIABETIC KIDNEY COMPLICATION, WITH LONG-TERM CURRENT USE OF INSULIN (HCC): ICD-10-CM

## 2025-06-10 DIAGNOSIS — E11.29 TYPE 2 DIABETES MELLITUS WITH OTHER DIABETIC KIDNEY COMPLICATION, WITH LONG-TERM CURRENT USE OF INSULIN (HCC): ICD-10-CM

## 2025-06-10 RX ORDER — INSULIN GLARGINE 100 [IU]/ML
75 INJECTION, SOLUTION SUBCUTANEOUS NIGHTLY
Qty: 18 ADJUSTABLE DOSE PRE-FILLED PEN SYRINGE | Refills: 5 | Status: SHIPPED | OUTPATIENT
Start: 2025-06-10 | End: 2025-12-07

## 2025-06-10 NOTE — TELEPHONE ENCOUNTER
Mr. Rubio is requesting refills of:    insulin glargine (LANTUS SOLOSTAR) 100 UNIT/ML injection pen         to be sent to   [id number] [place], [place] - [address] - P [phone] - F [phone]  [address] [facility] [address]  Phone: [phone] Fax: [phone]  .     LAST OFFICE VISIT:  6/9/2025     UPCOMING APPOINTMENT(S):  Future Appointments   Date Time Provider Department Center   7/8/2025  9:15 AM Justine Romero MD [facility] DE[facility]       Patient offered an appointment? N/a  How much medication does the patient have on hand? 0    Provided notified

## 2025-06-11 NOTE — TELEPHONE ENCOUNTER
Orders Placed This Encounter    insulin glargine (LANTUS SOLOSTAR) 100 UNIT/ML injection pen     Sig: Inject 75 Units into the skin nightly     Dispense:  18 Adjustable Dose Pre-filled Pen Syringe     Refill:  5

## 2025-07-08 ENCOUNTER — HOSPITAL ENCOUNTER (OUTPATIENT)
Facility: HOSPITAL | Age: 45
Setting detail: SPECIMEN
Discharge: HOME | End: 2025-07-11

## 2025-07-08 ENCOUNTER — OFFICE VISIT (OUTPATIENT)
Facility: CLINIC | Age: 45
End: 2025-07-08
Payer: MEDICAID

## 2025-07-08 VITALS
TEMPERATURE: 97.4 F | WEIGHT: 315 LBS | RESPIRATION RATE: 16 BRPM | HEART RATE: 80 BPM | HEIGHT: 71 IN | OXYGEN SATURATION: 94 % | DIASTOLIC BLOOD PRESSURE: 88 MMHG | SYSTOLIC BLOOD PRESSURE: 157 MMHG | BODY MASS INDEX: 44.1 KG/M2

## 2025-07-08 DIAGNOSIS — E11.29 TYPE 2 DIABETES MELLITUS WITH OTHER DIABETIC KIDNEY COMPLICATION, WITH LONG-TERM CURRENT USE OF INSULIN (HCC): ICD-10-CM

## 2025-07-08 DIAGNOSIS — Z79.4 TYPE 2 DIABETES MELLITUS WITH OTHER DIABETIC KIDNEY COMPLICATION, WITH LONG-TERM CURRENT USE OF INSULIN (HCC): ICD-10-CM

## 2025-07-08 DIAGNOSIS — E78.5 HYPERLIPIDEMIA, UNSPECIFIED HYPERLIPIDEMIA TYPE: ICD-10-CM

## 2025-07-08 DIAGNOSIS — I10 ESSENTIAL HYPERTENSION: Primary | ICD-10-CM

## 2025-07-08 DIAGNOSIS — R60.0 LEG EDEMA, LEFT: ICD-10-CM

## 2025-07-08 DIAGNOSIS — Z99.81 ON HOME OXYGEN THERAPY: ICD-10-CM

## 2025-07-08 PROCEDURE — 3077F SYST BP >= 140 MM HG: CPT | Performed by: FAMILY MEDICINE

## 2025-07-08 PROCEDURE — 3079F DIAST BP 80-89 MM HG: CPT | Performed by: FAMILY MEDICINE

## 2025-07-08 PROCEDURE — 3046F HEMOGLOBIN A1C LEVEL >9.0%: CPT | Performed by: FAMILY MEDICINE

## 2025-07-08 PROCEDURE — 99214 OFFICE O/P EST MOD 30 MIN: CPT | Performed by: FAMILY MEDICINE

## 2025-07-08 PROCEDURE — 99001 SPECIMEN HANDLING PT-LAB: CPT

## 2025-07-08 RX ORDER — HYDRALAZINE HYDROCHLORIDE 100 MG/1
100 TABLET, FILM COATED ORAL 3 TIMES DAILY
Qty: 270 TABLET | Refills: 1 | Status: SHIPPED | OUTPATIENT
Start: 2025-07-08

## 2025-07-08 RX ORDER — AMLODIPINE BESYLATE 10 MG/1
10 TABLET ORAL DAILY
Qty: 90 TABLET | Refills: 1 | Status: SHIPPED | OUTPATIENT
Start: 2025-07-08

## 2025-07-08 SDOH — ECONOMIC STABILITY: FOOD INSECURITY: WITHIN THE PAST 12 MONTHS, YOU WORRIED THAT YOUR FOOD WOULD RUN OUT BEFORE YOU GOT MONEY TO BUY MORE.: NEVER TRUE

## 2025-07-08 SDOH — ECONOMIC STABILITY: FOOD INSECURITY: WITHIN THE PAST 12 MONTHS, THE FOOD YOU BOUGHT JUST DIDN'T LAST AND YOU DIDN'T HAVE MONEY TO GET MORE.: NEVER TRUE

## 2025-07-08 ASSESSMENT — PATIENT HEALTH QUESTIONNAIRE - PHQ9
SUM OF ALL RESPONSES TO PHQ QUESTIONS 1-9: 0
SUM OF ALL RESPONSES TO PHQ QUESTIONS 1-9: 0
2. FEELING DOWN, DEPRESSED OR HOPELESS: NOT AT ALL
SUM OF ALL RESPONSES TO PHQ QUESTIONS 1-9: 0
SUM OF ALL RESPONSES TO PHQ QUESTIONS 1-9: 0
1. LITTLE INTEREST OR PLEASURE IN DOING THINGS: NOT AT ALL

## 2025-07-08 NOTE — ASSESSMENT & PLAN NOTE
INCREASED Trulicity to 4.5 mg weekly.  Advised to DECREASE Humalog (insulin Lispro) Quick pen  to 10 units prior to each meal.        Orders:    Dulaglutide 4.5 MG/0.5ML SOAJ; Inject 4.5 mg into the skin once a week    Comprehensive Metabolic Panel; Future     16

## 2025-07-08 NOTE — PROGRESS NOTES
Garrett Rubio (:  1980) is a 44 y.o. male,Established patient, here for evaluation of the following chief complaint(s):  Follow-up, Diabetes, Weight Management, Hypertension, and Leg Swelling (left)         Assessment & Plan  Essential hypertension  -Uncontrolled.  Still not at goal.  -Patient notes having back pain that is 8 out of 10.   -He did take his pain meds prior to coming into office.      -Will continue on current meds and doses for now.    -Increased hydralazine 10 mg  to TID instead of BID.      -Continue amlodipine 10 mg and carvedilol 67.25 mg BID.       Orders:    amLODIPine (NORVASC) 10 MG tablet; Take 1 tablet by mouth daily    hydrALAZINE (APRESOLINE) 100 MG tablet; Take 1 tablet by mouth 3 times daily    Comprehensive Metabolic Panel; Future    Type 2 diabetes mellitus with other diabetic kidney complication, with long-term current use of insulin (HCC)  INCREASED Trulicity to 4.5 mg weekly.  Advised to DECREASE Humalog (insulin Lispro) Quick pen  to 10 units prior to each meal.        Orders:    Dulaglutide 4.5 MG/0.5ML SOAJ; Inject 4.5 mg into the skin once a week    Comprehensive Metabolic Panel; Future    Leg edema, left  -Elevate leg above heart level for 30 minutes 3 or 4 times per day  -Increase water intake (8 glasses a day)  -Increase physical activity.  Note that working muscles thus increases flow of lymph fluid through the lymphatic system.  -Maintain a healthy weight  -Reduce salt intake   Orders:    Comprehensive Metabolic Panel; Future    On home oxygen therapy   -On 2 L of oxygen continuously          Hyperlipidemia, unspecified hyperlipidemia type  -On atorvastatin 40 mg nightly          Return in about 1 month (around 2025) for HTN, DM, HLD, COPD, WEIGHT CONCERNS, OV extended.       Subjective   44-year-old male with a history significant for type 2 diabetes, chronic kidney disease, hypertension, hyperlipidemia, COPD on 2 L of oxygen continuously, GERD,

## 2025-07-08 NOTE — ASSESSMENT & PLAN NOTE
-Uncontrolled.  Still not at goal.  -Patient notes having back pain that is 8 out of 10.   -He did take his pain meds prior to coming into office.      -Will continue on current meds and doses for now.    -Increased hydralazine 10 mg  to TID instead of BID.      -Continue amlodipine 10 mg and carvedilol 67.25 mg BID.       Orders:    amLODIPine (NORVASC) 10 MG tablet; Take 1 tablet by mouth daily    hydrALAZINE (APRESOLINE) 100 MG tablet; Take 1 tablet by mouth 3 times daily    Comprehensive Metabolic Panel; Future

## 2025-07-09 LAB
A/G RATIO: 1.2 RATIO (ref 1.1–2.6)
ALBUMIN: 3.9 G/DL (ref 3.5–5)
ALP BLD-CCNC: 81 U/L (ref 25–115)
ALT SERPL-CCNC: 22 U/L (ref 5–40)
ANION GAP SERPL CALCULATED.3IONS-SCNC: 13 MMOL/L (ref 3–15)
AST SERPL-CCNC: 15 U/L (ref 10–37)
BILIRUB SERPL-MCNC: 0.3 MG/DL (ref 0.2–1.2)
BUN BLDV-MCNC: 19 MG/DL (ref 6–22)
CALCIUM SERPL-MCNC: 8.9 MG/DL (ref 8.4–10.5)
CHLORIDE BLD-SCNC: 100 MMOL/L (ref 98–110)
CO2: 27 MMOL/L (ref 20–32)
CREAT SERPL-MCNC: 1.5 MG/DL (ref 0.5–1.2)
GFR, ESTIMATED: 56.4 ML/MIN/1.73 SQ.M.
GLOBULIN: 3.3 G/DL (ref 2–4)
GLUCOSE: 155 MG/DL (ref 70–99)
POTASSIUM SERPL-SCNC: 4.5 MMOL/L (ref 3.5–5.5)
SODIUM BLD-SCNC: 140 MMOL/L (ref 133–145)
TOTAL PROTEIN: 7.2 G/DL (ref 6.4–8.3)

## 2025-07-13 LAB — SENTARA SPECIMEN COLLECTION: NORMAL

## 2025-07-15 PROBLEM — R60.0 LEG EDEMA, LEFT: Status: ACTIVE | Noted: 2025-07-15

## 2025-07-15 ASSESSMENT — ENCOUNTER SYMPTOMS: COUGH: 1

## 2025-07-15 NOTE — ASSESSMENT & PLAN NOTE
-Elevate leg above heart level for 30 minutes 3 or 4 times per day  -Increase water intake (8 glasses a day)  -Increase physical activity.  Note that working muscles thus increases flow of lymph fluid through the lymphatic system.  -Maintain a healthy weight  -Reduce salt intake   Orders:    Comprehensive Metabolic Panel; Future

## 2025-07-17 DIAGNOSIS — I10 ESSENTIAL HYPERTENSION: ICD-10-CM

## 2025-07-17 NOTE — TELEPHONE ENCOUNTER
Patient request for a new 90 day refill #180 to the [facility] on file.    carvedilol (COREG) 6.25 MG tablet     Last Appointment:  7/8/2025  Future Appointments   Date Time Provider Department Center   8/12/2025  2:00 PM Justine Romero MD [facility] DE[facility]

## 2025-07-18 NOTE — TELEPHONE ENCOUNTER
Mr. Rubio is requesting refills of:    Requested Prescriptions      No prescriptions requested or ordered in this encounter       **(Remove additional pharmacy names that are not being requested by right-clicking then select \"make the text editable and delete, as applicable.)**  to be sent to   [id number] [place], [place] - [address] - P [phone] - F [phone]  [address] [facility] [address]  Phone: [phone] Fax: [phone]  .     LAST OFFICE VISIT:  7/8/2025     UPCOMING APPOINTMENT(S):  Future Appointments   Date Time Provider Department Center   8/12/2025  2:00 PM Justine Romero MD [facility] DE[facility]       Patient offered an appointment? no  How much medication does the patient have on hand? 0    Provided notified

## 2025-07-19 RX ORDER — CARVEDILOL 6.25 MG/1
6.25 TABLET ORAL 2 TIMES DAILY WITH MEALS
Qty: 90 TABLET | Refills: 1 | Status: SHIPPED | OUTPATIENT
Start: 2025-07-19

## 2025-07-19 NOTE — TELEPHONE ENCOUNTER
Orders Placed This Encounter    carvedilol (COREG) 6.25 MG tablet     Sig: Take 1 tablet by mouth 2 times daily (with meals)     Dispense:  90 tablet     Refill:  1

## 2025-08-11 DIAGNOSIS — I10 ESSENTIAL HYPERTENSION: ICD-10-CM

## 2025-08-12 RX ORDER — BENAZEPRIL HYDROCHLORIDE 10 MG/1
10 TABLET ORAL DAILY
Qty: 90 TABLET | Refills: 1 | Status: SHIPPED | OUTPATIENT
Start: 2025-08-12

## 2025-08-28 ENCOUNTER — OFFICE VISIT (OUTPATIENT)
Facility: CLINIC | Age: 45
End: 2025-08-28
Payer: MEDICAID

## 2025-08-28 VITALS
TEMPERATURE: 97.2 F | HEIGHT: 71 IN | DIASTOLIC BLOOD PRESSURE: 77 MMHG | BODY MASS INDEX: 44.1 KG/M2 | RESPIRATION RATE: 20 BRPM | OXYGEN SATURATION: 95 % | WEIGHT: 315 LBS | HEART RATE: 87 BPM | SYSTOLIC BLOOD PRESSURE: 164 MMHG

## 2025-08-28 DIAGNOSIS — E11.29 TYPE 2 DIABETES MELLITUS WITH OTHER DIABETIC KIDNEY COMPLICATION, WITH LONG-TERM CURRENT USE OF INSULIN (HCC): ICD-10-CM

## 2025-08-28 DIAGNOSIS — E78.5 HYPERLIPIDEMIA, UNSPECIFIED HYPERLIPIDEMIA TYPE: ICD-10-CM

## 2025-08-28 DIAGNOSIS — E66.01 MORBID OBESITY WITH BMI OF 45.0-49.9, ADULT (HCC): ICD-10-CM

## 2025-08-28 DIAGNOSIS — Z02.9 ADMINISTRATIVE ENCOUNTER: ICD-10-CM

## 2025-08-28 DIAGNOSIS — N31.9 NEUROGENIC INCONTINENCE: ICD-10-CM

## 2025-08-28 DIAGNOSIS — G89.29 CHRONIC BILATERAL LOW BACK PAIN WITH LEFT-SIDED SCIATICA: ICD-10-CM

## 2025-08-28 DIAGNOSIS — J44.9 COPD WITH HYPOXIA (HCC): ICD-10-CM

## 2025-08-28 DIAGNOSIS — I10 ESSENTIAL HYPERTENSION: Primary | ICD-10-CM

## 2025-08-28 DIAGNOSIS — Z99.81 ON HOME OXYGEN THERAPY: ICD-10-CM

## 2025-08-28 DIAGNOSIS — M54.42 CHRONIC BILATERAL LOW BACK PAIN WITH LEFT-SIDED SCIATICA: ICD-10-CM

## 2025-08-28 DIAGNOSIS — K21.9 GASTROESOPHAGEAL REFLUX DISEASE, UNSPECIFIED WHETHER ESOPHAGITIS PRESENT: ICD-10-CM

## 2025-08-28 DIAGNOSIS — Z79.4 TYPE 2 DIABETES MELLITUS WITH OTHER DIABETIC KIDNEY COMPLICATION, WITH LONG-TERM CURRENT USE OF INSULIN (HCC): ICD-10-CM

## 2025-08-28 PROCEDURE — 3077F SYST BP >= 140 MM HG: CPT | Performed by: FAMILY MEDICINE

## 2025-08-28 PROCEDURE — 3046F HEMOGLOBIN A1C LEVEL >9.0%: CPT | Performed by: FAMILY MEDICINE

## 2025-08-28 PROCEDURE — 3078F DIAST BP <80 MM HG: CPT | Performed by: FAMILY MEDICINE

## 2025-08-28 PROCEDURE — 99215 OFFICE O/P EST HI 40 MIN: CPT | Performed by: FAMILY MEDICINE

## 2025-08-28 RX ORDER — OMEPRAZOLE 40 MG/1
40 CAPSULE, DELAYED RELEASE ORAL DAILY
Qty: 90 CAPSULE | Refills: 1 | Status: SHIPPED | OUTPATIENT
Start: 2025-08-28

## 2025-08-28 RX ORDER — KETOROLAC TROMETHAMINE 30 MG/ML
60 INJECTION, SOLUTION INTRAMUSCULAR; INTRAVENOUS ONCE
Status: COMPLETED | OUTPATIENT
Start: 2025-08-28 | End: 2025-08-28

## 2025-08-28 RX ORDER — ATORVASTATIN CALCIUM 40 MG/1
40 TABLET, FILM COATED ORAL DAILY
Qty: 90 TABLET | Refills: 1 | Status: SHIPPED | OUTPATIENT
Start: 2025-08-28

## 2025-08-28 RX ORDER — FUROSEMIDE 20 MG/1
20 TABLET ORAL DAILY
Qty: 90 TABLET | Refills: 1 | Status: SHIPPED | OUTPATIENT
Start: 2025-08-28

## 2025-08-28 RX ADMIN — KETOROLAC TROMETHAMINE 60 MG: 30 INJECTION, SOLUTION INTRAMUSCULAR; INTRAVENOUS at 17:29

## 2025-08-28 ASSESSMENT — PATIENT HEALTH QUESTIONNAIRE - PHQ9
SUM OF ALL RESPONSES TO PHQ QUESTIONS 1-9: 2
SUM OF ALL RESPONSES TO PHQ QUESTIONS 1-9: 2
2. FEELING DOWN, DEPRESSED OR HOPELESS: SEVERAL DAYS
SUM OF ALL RESPONSES TO PHQ QUESTIONS 1-9: 2
1. LITTLE INTEREST OR PLEASURE IN DOING THINGS: SEVERAL DAYS
SUM OF ALL RESPONSES TO PHQ QUESTIONS 1-9: 2

## 2025-08-28 ASSESSMENT — ENCOUNTER SYMPTOMS
SHORTNESS OF BREATH: 1
COUGH: 1
ABDOMINAL PAIN: 0

## 2025-08-28 ASSESSMENT — COPD QUESTIONNAIRES: COPD: 1

## (undated) DEVICE — SPONGE GZ W4XL4IN COT 12 PLY TYP VII WVN C FLD DSGN

## (undated) DEVICE — NITINOL STONE RETRIEVAL BASKET: Brand: ZERO TIP

## (undated) DEVICE — DUAL LUMEN URETERAL CATHETER

## (undated) DEVICE — Device

## (undated) DEVICE — SOLUTION IRRIG 1000ML H2O STRL BLT

## (undated) DEVICE — TRAY PREP DRY W/ PREM GLV 2 APPL 6 SPNG 2 UNDPD 1 OVERWRAP

## (undated) DEVICE — SOLUTION IV 1000ML 0.9% SOD CHL

## (undated) DEVICE — GDWIRE 3CM FLX-TIP 0.038X150CM -- BX/5 SENSOR

## (undated) DEVICE — KENDALL SCD EXPRESS SLEEVES, KNEE LENGTH, MEDIUM: Brand: KENDALL SCD

## (undated) DEVICE — SYRINGE MED 20ML STD CLR PLAS LUERLOCK TIP N CTRL DISP

## (undated) DEVICE — CONTAINER DRN 20L DISP FLUIDRN LLS

## (undated) DEVICE — TUBING IRRIG L77IN DIA0.241IN L BOR FOR CYSTO W/ NVENT

## (undated) DEVICE — SOLUTION IRRIG 3000ML 0.9% SOD CHL FLX CONT 0797208] ICU MEDICAL INC]

## (undated) DEVICE — SOLUTION IRRIG 3000ML H2O STRL BAG

## (undated) DEVICE — STERILE LATEX POWDER-FREE SURGICAL GLOVESWITH NITRILE COATING: Brand: PROTEXIS